# Patient Record
Sex: FEMALE | Race: WHITE | Employment: PART TIME | URBAN - METROPOLITAN AREA
[De-identification: names, ages, dates, MRNs, and addresses within clinical notes are randomized per-mention and may not be internally consistent; named-entity substitution may affect disease eponyms.]

---

## 2019-07-10 ENCOUNTER — HOSPITAL ENCOUNTER (EMERGENCY)
Age: 79
Discharge: HOME | End: 2019-07-10
Attending: EMERGENCY MEDICINE
Payer: MEDICARE

## 2019-07-10 ENCOUNTER — APPOINTMENT (OUTPATIENT)
Dept: GENERAL RADIOLOGY | Age: 79
End: 2019-07-10
Payer: MEDICARE

## 2019-07-10 ENCOUNTER — APPOINTMENT (OUTPATIENT)
Dept: CT IMAGING | Age: 79
End: 2019-07-10
Payer: MEDICARE

## 2019-07-10 ENCOUNTER — HOSPITAL ENCOUNTER (OUTPATIENT)
Dept: NON INVASIVE DIAGNOSTICS | Age: 79
Discharge: HOME | End: 2019-07-10
Payer: MEDICARE

## 2019-07-10 VITALS
BODY MASS INDEX: 35.49 KG/M2 | WEIGHT: 213 LBS | HEART RATE: 58 BPM | SYSTOLIC BLOOD PRESSURE: 159 MMHG | RESPIRATION RATE: 17 BRPM | OXYGEN SATURATION: 100 % | TEMPERATURE: 97.6 F | HEIGHT: 65 IN | DIASTOLIC BLOOD PRESSURE: 60 MMHG

## 2019-07-10 DIAGNOSIS — R06.00 DYSPNEA, UNSPECIFIED TYPE: Primary | ICD-10-CM

## 2019-07-10 LAB
ANION GAP SERPL CALCULATED.3IONS-SCNC: 10 MMOL/L (ref 3–16)
BASE EXCESS VENOUS: 4 (ref -3–3)
BASOPHILS ABSOLUTE: 0 K/UL (ref 0–0.2)
BASOPHILS RELATIVE PERCENT: 0.8 %
BUN BLDV-MCNC: 18 MG/DL (ref 7–20)
CALCIUM SERPL-MCNC: 9.7 MG/DL (ref 8.3–10.6)
CHLORIDE BLD-SCNC: 102 MMOL/L (ref 99–110)
CO2: 26 MMOL/L (ref 21–32)
CREAT SERPL-MCNC: 1 MG/DL (ref 0.6–1.2)
EKG ATRIAL RATE: 56 BPM
EKG DIAGNOSIS: NORMAL
EKG P AXIS: 48 DEGREES
EKG P-R INTERVAL: 194 MS
EKG Q-T INTERVAL: 434 MS
EKG QRS DURATION: 92 MS
EKG QTC CALCULATION (BAZETT): 418 MS
EKG R AXIS: 6 DEGREES
EKG T AXIS: 42 DEGREES
EKG VENTRICULAR RATE: 56 BPM
EOSINOPHILS ABSOLUTE: 0.1 K/UL (ref 0–0.6)
EOSINOPHILS RELATIVE PERCENT: 2 %
GFR AFRICAN AMERICAN: >60
GFR NON-AFRICAN AMERICAN: 54
GLUCOSE BLD-MCNC: 89 MG/DL (ref 70–99)
HCO3 VENOUS: 27.2 MMOL/L (ref 23–29)
HCT VFR BLD CALC: 41.9 % (ref 36–48)
HEMOGLOBIN: 13.9 G/DL (ref 12–16)
LV EF: 58 %
LVEF MODALITY: NORMAL
LYMPHOCYTES ABSOLUTE: 1.4 K/UL (ref 1–5.1)
LYMPHOCYTES RELATIVE PERCENT: 24.8 %
MCH RBC QN AUTO: 31.8 PG (ref 26–34)
MCHC RBC AUTO-ENTMCNC: 33.3 G/DL (ref 31–36)
MCV RBC AUTO: 95.6 FL (ref 80–100)
MONOCYTES ABSOLUTE: 0.4 K/UL (ref 0–1.3)
MONOCYTES RELATIVE PERCENT: 7.2 %
NEUTROPHILS ABSOLUTE: 3.6 K/UL (ref 1.7–7.7)
NEUTROPHILS RELATIVE PERCENT: 65.2 %
O2 SAT, VEN: 47 %
PCO2, VEN: 34.1 MM HG (ref 40–50)
PDW BLD-RTO: 13 % (ref 12.4–15.4)
PERFORMED ON: ABNORMAL
PH VENOUS: 7.51 (ref 7.35–7.45)
PLATELET # BLD: 216 K/UL (ref 135–450)
PMV BLD AUTO: 8.6 FL (ref 5–10.5)
PO2, VEN: 23 MM HG
POC SAMPLE TYPE: ABNORMAL
POTASSIUM REFLEX MAGNESIUM: 4.4 MMOL/L (ref 3.5–5.1)
PRO-BNP: 153 PG/ML (ref 0–449)
RBC # BLD: 4.38 M/UL (ref 4–5.2)
SODIUM BLD-SCNC: 138 MMOL/L (ref 136–145)
TCO2 CALC VENOUS: 28 MMOL/L
TROPONIN: <0.01 NG/ML
WBC # BLD: 5.6 K/UL (ref 4–11)

## 2019-07-10 PROCEDURE — 93306 TTE W/DOPPLER COMPLETE: CPT

## 2019-07-10 PROCEDURE — 71046 X-RAY EXAM CHEST 2 VIEWS: CPT

## 2019-07-10 PROCEDURE — 85025 COMPLETE CBC W/AUTO DIFF WBC: CPT

## 2019-07-10 PROCEDURE — 83880 ASSAY OF NATRIURETIC PEPTIDE: CPT

## 2019-07-10 PROCEDURE — 84484 ASSAY OF TROPONIN QUANT: CPT

## 2019-07-10 PROCEDURE — 71260 CT THORAX DX C+: CPT

## 2019-07-10 PROCEDURE — 93005 ELECTROCARDIOGRAM TRACING: CPT | Performed by: EMERGENCY MEDICINE

## 2019-07-10 PROCEDURE — 80048 BASIC METABOLIC PNL TOTAL CA: CPT

## 2019-07-10 PROCEDURE — 82803 BLOOD GASES ANY COMBINATION: CPT

## 2019-07-10 PROCEDURE — 6360000004 HC RX CONTRAST MEDICATION: Performed by: EMERGENCY MEDICINE

## 2019-07-10 PROCEDURE — 99285 EMERGENCY DEPT VISIT HI MDM: CPT

## 2019-07-10 RX ORDER — OMEGA-3S/DHA/EPA/FISH OIL/D3 300MG-1000
400 CAPSULE ORAL DAILY
COMMUNITY
End: 2019-07-24

## 2019-07-10 RX ORDER — ASPIRIN 81 MG/1
81 TABLET, CHEWABLE ORAL DAILY
COMMUNITY

## 2019-07-10 RX ORDER — CHLORAL HYDRATE 500 MG
2000 CAPSULE ORAL DAILY
COMMUNITY

## 2019-07-10 RX ADMIN — IOPAMIDOL 80 ML: 755 INJECTION, SOLUTION INTRAVENOUS at 13:31

## 2019-07-10 SDOH — HEALTH STABILITY: MENTAL HEALTH: HOW OFTEN DO YOU HAVE A DRINK CONTAINING ALCOHOL?: NEVER

## 2019-07-10 ASSESSMENT — ENCOUNTER SYMPTOMS
CHOKING: 0
ABDOMINAL DISTENTION: 0
STRIDOR: 0
VOMITING: 0
CHEST TIGHTNESS: 0
APNEA: 0
COUGH: 0
NAUSEA: 0
SHORTNESS OF BREATH: 1
DIARRHEA: 0
WHEEZING: 0
CONSTIPATION: 0
RHINORRHEA: 0
ABDOMINAL PAIN: 0

## 2019-07-10 NOTE — ED NOTES
Pt given dc instructions, verbalized understanding. Pt educated to follow up with cardiology, verbalized understanding. Pt has no further questions or concerns at this time. Pt in no signs of distress. Pt has steady gait to lobby.         Bishop Goodwin RN  07/10/19 [id number]

## 2019-07-10 NOTE — ED PROVIDER NOTES
[address] [facility] ENCOUNTER          PHYSICIAN ASSISTANT NOTE       Date of evaluation: 7/10/2019    Chief Complaint     Shortness of Breath (since June 2019)      History of Present Illness     Wille Babinski is a 67 y.o. female who presents with shortness of breath that has been occurring for over one month. She flew to [place] for one month ago and had an episode of exertional shortness of breath, extreme fatigue and a racing heartbeat. She states that ever since she has had significant shortness of breath on exertion. She has been seen by her primary care provider to arrange for an outpatient echo to be performed today. Patient states she has been short of breath throughout the entire day today ever since she got out of her car to go to the echocardiogram.  She denies any associated chest pain. She states her symptoms do improve slightly with rest, however she still feels short of breath today. Typically her symptoms last only a short amount of time after resting. She admits to occasional orthopnea, dizziness and recent bilateral leg swelling. She has had two knee replacements, neck and back surgery but the most recent surgery was in 2018. Patient currently denies chest pain, long periods of immobility, recent illness or sick contacts, nausea, vomiting, fevers, chills, vision changes or headaches. She denies a history of smoking, alcoholism, heart disease or diabetes. Denies any history of pulmonary embolism or deep venous thrombosis. Review of Systems     Review of Systems   Constitutional: Positive for fatigue. Negative for chills and fever. HENT: Negative for congestion and rhinorrhea. Eyes: Negative for visual disturbance. Respiratory: Positive for shortness of breath. Negative for apnea, cough, choking, chest tightness, wheezing and stridor. Cardiovascular: Positive for palpitations and leg swelling. Negative for chest pain.    Gastrointestinal: Negative for ER (500),  August Lerner ([id number]) on 7/10/2019 11:37:52 AMAlso confirmed by MD, ER (500),  Juanita Ma ([id number])  on 7/10/2019 11:38:59 AM     RECENT VITALS:  BP: (!) 159/60, Temp: 97.6 °F (36.4 °C), Pulse: 58, Resp: 17, SpO2: 100 %       ED Course     Nursing Notes, Past Medical Hx,Past Surgical Hx, Social Hx, Allergies, and Family Hx were reviewed. The patient was given the following medications:  Orders Placed This Encounter   Medications    iopamidol (ISOVUE-370) 76 % injection 80 mL     CONSULTS:  [address] [place] [address],[address] / ASSESSMENT / Griselda Rigo is a 67 y.o. female presenting with increasing shortness of breath for the past month. Vital signs demonstrated tachypnea and bradycardia with afebrile temperature and BP within normal limits. A detailed history and physical exam were performed listed above. Due to the patient's presenting symptoms, EKG, CXR, CBC/BNP, VBG, BNP and troponins were ordered to test for cardiac/respiratory etiology. VBG showed respiratory alkalosis, other tests were unremarkable. A CT chest was then ordered to rule out PE, which showed no evidence of one but did show groundglass basilar airspace disease. Patient has had a Holter monitor in the past which reportedly revealed no abnormalities. Given the patient's symptoms, we did contact cardiology to discuss her case. After their evaluation, they state they will see her in their office as an outpatient. The patient is feeling significantly improved in the emergency department and is requesting to be discharged. She was ambulated throughout the emergency department. Per report from nurse patient tolerated the walking very well. She continues to request to be discharged at this time. We did speak with cardiology who is arrange for follow-up as an outpatient. I have low suspicion for pulmonary embolism given negative CT scan.   I have low suspicion for ACS given normal EKG,

## 2019-07-10 NOTE — ED NOTES
Pt taken around ER unit with pulse ox at 90% and heart rate in the 130's. Pt states she felt normal at the pace she was walking and not short of breath at the time. Once pt was sat down o2 sat went to 98% in less than 30 seconds.       Alexis Mayen RN  07/10/19 [id number]

## 2019-07-24 ENCOUNTER — OFFICE VISIT (OUTPATIENT)
Dept: CARDIOLOGY CLINIC | Age: 79
End: 2019-07-24
Payer: MEDICARE

## 2019-07-24 ENCOUNTER — TELEPHONE (OUTPATIENT)
Dept: CARDIOLOGY CLINIC | Age: 79
End: 2019-07-24

## 2019-07-24 VITALS
HEART RATE: 58 BPM | BODY MASS INDEX: 35.78 KG/M2 | SYSTOLIC BLOOD PRESSURE: 126 MMHG | OXYGEN SATURATION: 96 % | WEIGHT: 215 LBS | DIASTOLIC BLOOD PRESSURE: 78 MMHG

## 2019-07-24 DIAGNOSIS — E78.00 HYPERCHOLESTEROLEMIA: ICD-10-CM

## 2019-07-24 DIAGNOSIS — R00.0 TACHYCARDIA: ICD-10-CM

## 2019-07-24 DIAGNOSIS — R06.02 SOB (SHORTNESS OF BREATH): ICD-10-CM

## 2019-07-24 DIAGNOSIS — R00.2 PALPITATIONS: ICD-10-CM

## 2019-07-24 DIAGNOSIS — I35.1 NONRHEUMATIC AORTIC VALVE INSUFFICIENCY: Primary | ICD-10-CM

## 2019-07-24 LAB
CHOLESTEROL, TOTAL: 177 MG/DL (ref 0–199)
HDLC SERPL-MCNC: 62 MG/DL (ref 40–60)
LDL CHOLESTEROL CALCULATED: 84 MG/DL
TRIGL SERPL-MCNC: 153 MG/DL (ref 0–150)
TSH REFLEX: 2.28 UIU/ML (ref 0.27–4.2)
VLDLC SERPL CALC-MCNC: 31 MG/DL

## 2019-07-24 PROCEDURE — 99204 OFFICE O/P NEW MOD 45 MIN: CPT | Performed by: INTERNAL MEDICINE

## 2019-07-24 PROCEDURE — 93000 ELECTROCARDIOGRAM COMPLETE: CPT | Performed by: INTERNAL MEDICINE

## 2019-07-24 PROCEDURE — 0296T PR EXT ECG > 48HR TO 21 DAY RCRD W/CONECT INTL RCRD: CPT | Performed by: INTERNAL MEDICINE

## 2019-07-24 NOTE — PROGRESS NOTES
headaches. No numbness/tingling, speech problems or weakness. No history of a stroke or TIA. Psychological: No anxiety or depression  Hematological and Lymphatic: No abnormal bleeding or bruising, blood clots, jaundice. Endocrine: No malaise/lethargy, palpitations, polydipsia/polyuria, temperature intolerance or unexpected weight changes. Skin: No rashes or non-healing ulcers. PE:  Blood pressure 126/78, pulse 58, weight 215 lb (97.5 kg), SpO2 96 %. General (appearance): Well devel. No distress. Eyes: Anicteric. EOMI  Neck: Supple. No JVD  Ears/Nose/Mouth/Thorat: No cyanosis  CV: RRR. No m/r/g  Respiratory:  Clear B, normal respiratory effort  GI: Abd soft. NT. No peritoneal signs. Skin: Warm, dry. No rashes  Neuro/Psych: Alert and oriented x 3. Appropriate behavior  Ext:  No c/c. Bilateral LE edema  Pulses:  2+ carotid. No bruits    Lab Results   Component Value Date    WBC 5.6 07/10/2019    HGB 13.9 07/10/2019    HCT 41.9 07/10/2019    MCV 95.6 07/10/2019     07/10/2019     Lab Results   Component Value Date     07/10/2019    K 4.4 07/10/2019     07/10/2019    CO2 26 07/10/2019    BUN 18 07/10/2019    CREATININE 1.0 07/10/2019    GLUCOSE 89 07/10/2019    CALCIUM 9.7 07/10/2019    PROT 7.1 06/02/2010    LABALBU 4.5 06/02/2010    BILITOT 0.80 06/02/2010    ALKPHOS 76 06/02/2010    AST 22 06/02/2010    ALT 26 06/02/2010    LABGLOM 54 (A) 07/10/2019    GFRAA >60 07/10/2019    AGRATIO 1.7 06/02/2010     Lipids with PCP    7/10/2019 ECG: NSR    7/2019 TTE: EF 55-60%. No RWMA. Mild-mod AR. Tr mr, pr, and TR. PASP 25.    7/2019 PE CT Scan:  1. No evidence of pulmonary embolus   2. Groundglass basilar airspace disease, dependent congestion, reactive airway disease, or basilar pneumonitis. 3. Nodules in the right lower chest, 2 of which are stable from prior study, there is size and appearance using criteria favors benign etiology.  Six-month follow-up is recommended       Current

## 2019-07-29 ENCOUNTER — TELEPHONE (OUTPATIENT)
Dept: CARDIOLOGY CLINIC | Age: 79
End: 2019-07-29

## 2019-07-31 ENCOUNTER — HOSPITAL ENCOUNTER (OUTPATIENT)
Dept: NON INVASIVE DIAGNOSTICS | Age: 79
Discharge: HOME | End: 2019-07-31
Payer: MEDICARE

## 2019-07-31 DIAGNOSIS — R06.02 SOB (SHORTNESS OF BREATH): ICD-10-CM

## 2019-07-31 DIAGNOSIS — R00.0 TACHYCARDIA: ICD-10-CM

## 2019-07-31 DIAGNOSIS — R00.2 PALPITATIONS: ICD-10-CM

## 2019-07-31 LAB
LV EF: 78 %
LVEF MODALITY: NORMAL

## 2019-07-31 PROCEDURE — A9502 TC99M TETROFOSMIN: HCPCS | Performed by: INTERNAL MEDICINE

## 2019-07-31 PROCEDURE — 93017 CV STRESS TEST TRACING ONLY: CPT

## 2019-07-31 PROCEDURE — 3430000000 HC RX DIAGNOSTIC RADIOPHARMACEUTICAL: Performed by: INTERNAL MEDICINE

## 2019-07-31 PROCEDURE — 78452 HT MUSCLE IMAGE SPECT MULT: CPT

## 2019-07-31 PROCEDURE — 2580000003 HC RX 258: Performed by: INTERNAL MEDICINE

## 2019-07-31 RX ORDER — SODIUM CHLORIDE 0.9 % (FLUSH) 0.9 %
10 SYRINGE (ML) INJECTION PRN
Status: DISCONTINUED | OUTPATIENT
Start: 2019-07-31 | End: 2019-08-01 | Stop reason: HOSPADM

## 2019-07-31 RX ADMIN — TETROFOSMIN 10 MILLICURIE: 1.38 INJECTION, POWDER, LYOPHILIZED, FOR SOLUTION INTRAVENOUS at 13:46

## 2019-07-31 RX ADMIN — Medication 10 ML: at 13:47

## 2019-08-19 DIAGNOSIS — R00.2 PALPITATIONS: ICD-10-CM

## 2019-08-27 ENCOUNTER — TELEPHONE (OUTPATIENT)
Dept: CARDIOLOGY CLINIC | Age: 79
End: 2019-08-27

## 2019-08-27 PROCEDURE — 0298T PR EXT ECG > 48HR TO 21 DAY REVIEW AND INTERPRETATN: CPT | Performed by: INTERNAL MEDICINE

## 2019-08-28 DIAGNOSIS — R00.0 TACHYCARDIA: ICD-10-CM

## 2019-10-20 ENCOUNTER — HOSPITAL ENCOUNTER (OUTPATIENT)
Age: 79
Setting detail: OBSERVATION
Discharge: HOME | End: 2019-10-21
Attending: EMERGENCY MEDICINE | Admitting: INTERNAL MEDICINE
Payer: MEDICARE

## 2019-10-20 ENCOUNTER — APPOINTMENT (OUTPATIENT)
Dept: CT IMAGING | Age: 79
End: 2019-10-20
Payer: MEDICARE

## 2019-10-20 ENCOUNTER — APPOINTMENT (OUTPATIENT)
Dept: GENERAL RADIOLOGY | Age: 79
End: 2019-10-20
Payer: MEDICARE

## 2019-10-20 DIAGNOSIS — N32.81 OVERACTIVE BLADDER: ICD-10-CM

## 2019-10-20 DIAGNOSIS — R55 SYNCOPE AND COLLAPSE: Primary | ICD-10-CM

## 2019-10-20 PROBLEM — E78.5 HYPERLIPIDEMIA: Status: ACTIVE | Noted: 2019-10-20

## 2019-10-20 LAB
A/G RATIO: 1.6 (ref 1.1–2.2)
ALBUMIN SERPL-MCNC: 4.1 G/DL (ref 3.4–5)
ALP BLD-CCNC: 72 U/L (ref 40–129)
ALT SERPL-CCNC: 18 U/L (ref 10–40)
ANION GAP SERPL CALCULATED.3IONS-SCNC: 9 MMOL/L (ref 3–16)
AST SERPL-CCNC: 16 U/L (ref 15–37)
BASOPHILS ABSOLUTE: 0 K/UL (ref 0–0.2)
BASOPHILS RELATIVE PERCENT: 0.6 %
BILIRUB SERPL-MCNC: 1 MG/DL (ref 0–1)
BILIRUBIN DIRECT: <0.2 MG/DL (ref 0–0.3)
BILIRUBIN, INDIRECT: NORMAL MG/DL (ref 0–1)
BUN BLDV-MCNC: 15 MG/DL (ref 7–20)
CALCIUM SERPL-MCNC: 9.4 MG/DL (ref 8.3–10.6)
CHLORIDE BLD-SCNC: 102 MMOL/L (ref 99–110)
CO2: 25 MMOL/L (ref 21–32)
CREAT SERPL-MCNC: 0.9 MG/DL (ref 0.6–1.2)
EOSINOPHILS ABSOLUTE: 0.1 K/UL (ref 0–0.6)
EOSINOPHILS RELATIVE PERCENT: 1.7 %
GFR AFRICAN AMERICAN: >60
GFR NON-AFRICAN AMERICAN: >60
GLOBULIN: 2.6 G/DL
GLUCOSE BLD-MCNC: 141 MG/DL (ref 70–99)
GLUCOSE BLD-MCNC: 169 MG/DL (ref 70–99)
HCT VFR BLD CALC: 44.6 % (ref 36–48)
HEMOGLOBIN: 14.9 G/DL (ref 12–16)
INR BLD: 0.96 (ref 0.86–1.14)
LACTIC ACID: 1.8 MMOL/L (ref 0.4–2)
LIPASE: 21 U/L (ref 13–60)
LYMPHOCYTES ABSOLUTE: 1.6 K/UL (ref 1–5.1)
LYMPHOCYTES RELATIVE PERCENT: 25.9 %
MCH RBC QN AUTO: 32.4 PG (ref 26–34)
MCHC RBC AUTO-ENTMCNC: 33.5 G/DL (ref 31–36)
MCV RBC AUTO: 96.9 FL (ref 80–100)
MONOCYTES ABSOLUTE: 0.4 K/UL (ref 0–1.3)
MONOCYTES RELATIVE PERCENT: 7.3 %
NEUTROPHILS ABSOLUTE: 3.9 K/UL (ref 1.7–7.7)
NEUTROPHILS RELATIVE PERCENT: 64.5 %
PDW BLD-RTO: 13.2 % (ref 12.4–15.4)
PERFORMED ON: ABNORMAL
PLATELET # BLD: 209 K/UL (ref 135–450)
PMV BLD AUTO: 8.4 FL (ref 5–10.5)
POTASSIUM REFLEX MAGNESIUM: 4.2 MMOL/L (ref 3.5–5.1)
PROTHROMBIN TIME: 11 SEC (ref 9.8–13)
RBC # BLD: 4.6 M/UL (ref 4–5.2)
SODIUM BLD-SCNC: 136 MMOL/L (ref 136–145)
TOTAL PROTEIN: 6.7 G/DL (ref 6.4–8.2)
TROPONIN: <0.01 NG/ML
WBC # BLD: 6.1 K/UL (ref 4–11)

## 2019-10-20 PROCEDURE — 2580000003 HC RX 258: Performed by: INTERNAL MEDICINE

## 2019-10-20 PROCEDURE — 6360000002 HC RX W HCPCS: Performed by: EMERGENCY MEDICINE

## 2019-10-20 PROCEDURE — 6360000004 HC RX CONTRAST MEDICATION: Performed by: EMERGENCY MEDICINE

## 2019-10-20 PROCEDURE — 96361 HYDRATE IV INFUSION ADD-ON: CPT

## 2019-10-20 PROCEDURE — 2580000003 HC RX 258: Performed by: EMERGENCY MEDICINE

## 2019-10-20 PROCEDURE — 83605 ASSAY OF LACTIC ACID: CPT

## 2019-10-20 PROCEDURE — 6370000000 HC RX 637 (ALT 250 FOR IP): Performed by: INTERNAL MEDICINE

## 2019-10-20 PROCEDURE — 74177 CT ABD & PELVIS W/CONTRAST: CPT

## 2019-10-20 PROCEDURE — 85025 COMPLETE CBC W/AUTO DIFF WBC: CPT

## 2019-10-20 PROCEDURE — 85610 PROTHROMBIN TIME: CPT

## 2019-10-20 PROCEDURE — 84484 ASSAY OF TROPONIN QUANT: CPT

## 2019-10-20 PROCEDURE — 71045 X-RAY EXAM CHEST 1 VIEW: CPT

## 2019-10-20 PROCEDURE — 96374 THER/PROPH/DIAG INJ IV PUSH: CPT

## 2019-10-20 PROCEDURE — 99285 EMERGENCY DEPT VISIT HI MDM: CPT

## 2019-10-20 PROCEDURE — 93005 ELECTROCARDIOGRAM TRACING: CPT | Performed by: PHYSICIAN ASSISTANT

## 2019-10-20 PROCEDURE — 2580000003 HC RX 258: Performed by: PHYSICIAN ASSISTANT

## 2019-10-20 PROCEDURE — 80053 COMPREHEN METABOLIC PANEL: CPT

## 2019-10-20 PROCEDURE — G0378 HOSPITAL OBSERVATION PER HR: HCPCS

## 2019-10-20 PROCEDURE — 83690 ASSAY OF LIPASE: CPT

## 2019-10-20 PROCEDURE — 70450 CT HEAD/BRAIN W/O DYE: CPT

## 2019-10-20 PROCEDURE — 36415 COLL VENOUS BLD VENIPUNCTURE: CPT

## 2019-10-20 RX ORDER — FAMOTIDINE 20 MG/1
20 TABLET, FILM COATED ORAL 2 TIMES DAILY
Status: DISCONTINUED | OUTPATIENT
Start: 2019-10-20 | End: 2019-10-21 | Stop reason: HOSPADM

## 2019-10-20 RX ORDER — ASPIRIN 81 MG/1
81 TABLET, CHEWABLE ORAL DAILY
Status: DISCONTINUED | OUTPATIENT
Start: 2019-10-20 | End: 2019-10-21 | Stop reason: HOSPADM

## 2019-10-20 RX ORDER — ACETAMINOPHEN 325 MG/1
650 TABLET ORAL EVERY 4 HOURS PRN
Status: DISCONTINUED | OUTPATIENT
Start: 2019-10-20 | End: 2019-10-21 | Stop reason: HOSPADM

## 2019-10-20 RX ORDER — LORAZEPAM 2 MG/ML
1 INJECTION INTRAMUSCULAR ONCE
Status: COMPLETED | OUTPATIENT
Start: 2019-10-20 | End: 2019-10-20

## 2019-10-20 RX ORDER — 0.9 % SODIUM CHLORIDE 0.9 %
1000 INTRAVENOUS SOLUTION INTRAVENOUS ONCE
Status: COMPLETED | OUTPATIENT
Start: 2019-10-20 | End: 2019-10-20

## 2019-10-20 RX ORDER — SODIUM CHLORIDE 0.9 % (FLUSH) 0.9 %
10 SYRINGE (ML) INJECTION EVERY 12 HOURS SCHEDULED
Status: DISCONTINUED | OUTPATIENT
Start: 2019-10-20 | End: 2019-10-21 | Stop reason: HOSPADM

## 2019-10-20 RX ORDER — ATORVASTATIN CALCIUM 20 MG/1
20 TABLET, FILM COATED ORAL DAILY
Status: DISCONTINUED | OUTPATIENT
Start: 2019-10-20 | End: 2019-10-21 | Stop reason: HOSPADM

## 2019-10-20 RX ORDER — SODIUM CHLORIDE 0.9 % (FLUSH) 0.9 %
10 SYRINGE (ML) INJECTION PRN
Status: DISCONTINUED | OUTPATIENT
Start: 2019-10-20 | End: 2019-10-21 | Stop reason: HOSPADM

## 2019-10-20 RX ORDER — ONDANSETRON 2 MG/ML
4 INJECTION INTRAMUSCULAR; INTRAVENOUS EVERY 4 HOURS PRN
Status: DISCONTINUED | OUTPATIENT
Start: 2019-10-20 | End: 2019-10-21 | Stop reason: HOSPADM

## 2019-10-20 RX ORDER — SENNA AND DOCUSATE SODIUM 50; 8.6 MG/1; MG/1
2 TABLET, FILM COATED ORAL ONCE
Status: DISCONTINUED | OUTPATIENT
Start: 2019-10-20 | End: 2019-10-21 | Stop reason: HOSPADM

## 2019-10-20 RX ADMIN — IOPAMIDOL 80 ML: 755 INJECTION, SOLUTION INTRAVENOUS at 16:06

## 2019-10-20 RX ADMIN — ATORVASTATIN CALCIUM 20 MG: 20 TABLET, FILM COATED ORAL at 21:52

## 2019-10-20 RX ADMIN — Medication 10 ML: at 21:53

## 2019-10-20 RX ADMIN — SODIUM CHLORIDE 1000 ML: 9 INJECTION, SOLUTION INTRAVENOUS at 14:45

## 2019-10-20 RX ADMIN — LORAZEPAM 1 MG: 2 INJECTION INTRAMUSCULAR; INTRAVENOUS at 14:58

## 2019-10-20 ASSESSMENT — PAIN SCALES - GENERAL: PAINLEVEL_OUTOF10: 0

## 2019-10-21 ENCOUNTER — APPOINTMENT (OUTPATIENT)
Dept: VASCULAR LAB | Age: 79
End: 2019-10-21
Payer: MEDICARE

## 2019-10-21 VITALS
DIASTOLIC BLOOD PRESSURE: 63 MMHG | BODY MASS INDEX: 35.34 KG/M2 | RESPIRATION RATE: 16 BRPM | SYSTOLIC BLOOD PRESSURE: 140 MMHG | TEMPERATURE: 98.4 F | WEIGHT: 212.08 LBS | OXYGEN SATURATION: 95 % | HEART RATE: 70 BPM | HEIGHT: 65 IN

## 2019-10-21 LAB
ANION GAP SERPL CALCULATED.3IONS-SCNC: 5 MMOL/L (ref 3–16)
BASOPHILS ABSOLUTE: 0 K/UL (ref 0–0.2)
BASOPHILS RELATIVE PERCENT: 0.4 %
BUN BLDV-MCNC: 13 MG/DL (ref 7–20)
CALCIUM SERPL-MCNC: 9.1 MG/DL (ref 8.3–10.6)
CHLORIDE BLD-SCNC: 105 MMOL/L (ref 99–110)
CO2: 28 MMOL/L (ref 21–32)
CREAT SERPL-MCNC: 0.8 MG/DL (ref 0.6–1.2)
EKG ATRIAL RATE: 61 BPM
EKG DIAGNOSIS: NORMAL
EKG P AXIS: 56 DEGREES
EKG P-R INTERVAL: 194 MS
EKG Q-T INTERVAL: 472 MS
EKG QRS DURATION: 92 MS
EKG QTC CALCULATION (BAZETT): 475 MS
EKG R AXIS: 53 DEGREES
EKG T AXIS: 60 DEGREES
EKG VENTRICULAR RATE: 61 BPM
EOSINOPHILS ABSOLUTE: 0.1 K/UL (ref 0–0.6)
EOSINOPHILS RELATIVE PERCENT: 1.6 %
GFR AFRICAN AMERICAN: >60
GFR NON-AFRICAN AMERICAN: >60
GLUCOSE BLD-MCNC: 107 MG/DL (ref 70–99)
HCT VFR BLD CALC: 39.9 % (ref 36–48)
HEMOGLOBIN: 13.1 G/DL (ref 12–16)
LYMPHOCYTES ABSOLUTE: 1.4 K/UL (ref 1–5.1)
LYMPHOCYTES RELATIVE PERCENT: 20.8 %
MCH RBC QN AUTO: 31.8 PG (ref 26–34)
MCHC RBC AUTO-ENTMCNC: 32.7 G/DL (ref 31–36)
MCV RBC AUTO: 97.1 FL (ref 80–100)
MONOCYTES ABSOLUTE: 0.5 K/UL (ref 0–1.3)
MONOCYTES RELATIVE PERCENT: 7.7 %
NEUTROPHILS ABSOLUTE: 4.7 K/UL (ref 1.7–7.7)
NEUTROPHILS RELATIVE PERCENT: 69.5 %
PDW BLD-RTO: 13.3 % (ref 12.4–15.4)
PLATELET # BLD: 198 K/UL (ref 135–450)
PMV BLD AUTO: 8.3 FL (ref 5–10.5)
POTASSIUM REFLEX MAGNESIUM: 4.1 MMOL/L (ref 3.5–5.1)
RBC # BLD: 4.11 M/UL (ref 4–5.2)
SODIUM BLD-SCNC: 138 MMOL/L (ref 136–145)
WBC # BLD: 6.7 K/UL (ref 4–11)

## 2019-10-21 PROCEDURE — G0378 HOSPITAL OBSERVATION PER HR: HCPCS

## 2019-10-21 PROCEDURE — 80048 BASIC METABOLIC PNL TOTAL CA: CPT

## 2019-10-21 PROCEDURE — 85025 COMPLETE CBC W/AUTO DIFF WBC: CPT

## 2019-10-21 PROCEDURE — 93880 EXTRACRANIAL BILAT STUDY: CPT

## 2019-10-21 PROCEDURE — 2580000003 HC RX 258: Performed by: INTERNAL MEDICINE

## 2019-10-21 PROCEDURE — 96372 THER/PROPH/DIAG INJ SC/IM: CPT

## 2019-10-21 PROCEDURE — 6360000002 HC RX W HCPCS: Performed by: INTERNAL MEDICINE

## 2019-10-21 PROCEDURE — 90686 IIV4 VACC NO PRSV 0.5 ML IM: CPT | Performed by: INTERNAL MEDICINE

## 2019-10-21 PROCEDURE — 6370000000 HC RX 637 (ALT 250 FOR IP): Performed by: INTERNAL MEDICINE

## 2019-10-21 PROCEDURE — 36415 COLL VENOUS BLD VENIPUNCTURE: CPT

## 2019-10-21 PROCEDURE — G0008 ADMIN INFLUENZA VIRUS VAC: HCPCS | Performed by: INTERNAL MEDICINE

## 2019-10-21 RX ADMIN — ENOXAPARIN SODIUM 40 MG: 40 INJECTION SUBCUTANEOUS at 09:31

## 2019-10-21 RX ADMIN — ASPIRIN 81 MG 81 MG: 81 TABLET ORAL at 09:31

## 2019-10-21 RX ADMIN — INFLUENZA A VIRUS A/BRISBANE/02/2018 IVR-190 (H1N1) ANTIGEN (PROPIOLACTONE INACTIVATED), INFLUENZA A VIRUS A/KANSAS/14/2017 X-327 (H3N2) ANTIGEN (PROPIOLACTONE INACTIVATED), INFLUENZA B VIRUS B/MARYLAND/15/2016 ANTIGEN (PROPIOLACTONE INACTIVATED), INFLUENZA B VIRUS B/PHUKET/3073/2013 BVR-1B ANTIGEN (PROPIOLACTONE INACTIVATED) 0.5 ML: 15; 15; 15; 15 INJECTION, SUSPENSION INTRAMUSCULAR at 12:11

## 2019-10-21 RX ADMIN — ATORVASTATIN CALCIUM 20 MG: 20 TABLET, FILM COATED ORAL at 09:31

## 2019-10-21 RX ADMIN — Medication 10 ML: at 09:31

## 2019-10-21 RX ADMIN — FAMOTIDINE 20 MG: 20 TABLET, FILM COATED ORAL at 09:31

## 2019-10-21 ASSESSMENT — PAIN SCALES - GENERAL
PAINLEVEL_OUTOF10: 0
PAINLEVEL_OUTOF10: 6
PAINLEVEL_OUTOF10: 0

## 2019-10-21 ASSESSMENT — PAIN - FUNCTIONAL ASSESSMENT
PAIN_FUNCTIONAL_ASSESSMENT: ACTIVITIES ARE NOT PREVENTED
PAIN_FUNCTIONAL_ASSESSMENT: ACTIVITIES ARE NOT PREVENTED

## 2019-10-21 ASSESSMENT — PAIN DESCRIPTION - LOCATION: LOCATION: ABDOMEN

## 2019-10-21 ASSESSMENT — PAIN DESCRIPTION - ONSET: ONSET: SUDDEN

## 2019-10-21 ASSESSMENT — PAIN DESCRIPTION - ORIENTATION: ORIENTATION: RIGHT

## 2019-10-21 ASSESSMENT — PAIN DESCRIPTION - DESCRIPTORS: DESCRIPTORS: ACHING

## 2019-10-21 ASSESSMENT — PAIN DESCRIPTION - PROGRESSION: CLINICAL_PROGRESSION: NOT CHANGED

## 2019-10-21 ASSESSMENT — PAIN DESCRIPTION - PAIN TYPE: TYPE: CHRONIC PAIN

## 2019-10-21 ASSESSMENT — PAIN DESCRIPTION - FREQUENCY: FREQUENCY: CONTINUOUS

## 2019-10-30 ENCOUNTER — OFFICE VISIT (OUTPATIENT)
Dept: CARDIOLOGY CLINIC | Age: 79
End: 2019-10-30
Payer: MEDICARE

## 2019-10-30 VITALS
DIASTOLIC BLOOD PRESSURE: 68 MMHG | BODY MASS INDEX: 35.11 KG/M2 | SYSTOLIC BLOOD PRESSURE: 104 MMHG | WEIGHT: 211 LBS | HEART RATE: 60 BPM

## 2019-10-30 DIAGNOSIS — I47.1 SVT (SUPRAVENTRICULAR TACHYCARDIA) (HCC): ICD-10-CM

## 2019-10-30 DIAGNOSIS — E78.00 HYPERCHOLESTEROLEMIA: ICD-10-CM

## 2019-10-30 DIAGNOSIS — I35.1 NONRHEUMATIC AORTIC VALVE INSUFFICIENCY: ICD-10-CM

## 2019-10-30 DIAGNOSIS — R00.2 PALPITATIONS: Primary | ICD-10-CM

## 2019-10-30 DIAGNOSIS — R55 SYNCOPE, UNSPECIFIED SYNCOPE TYPE: ICD-10-CM

## 2019-10-30 PROCEDURE — 99214 OFFICE O/P EST MOD 30 MIN: CPT | Performed by: INTERNAL MEDICINE

## 2019-10-30 RX ORDER — MULTIVIT,IRON,MINERALS/LUTEIN
TABLET ORAL
COMMUNITY

## 2019-11-20 ENCOUNTER — TELEPHONE (OUTPATIENT)
Dept: CASE MANAGEMENT | Age: 79
End: 2019-11-20

## 2019-11-27 ENCOUNTER — OFFICE VISIT (OUTPATIENT)
Dept: ENT CLINIC | Age: 79
End: 2019-11-27
Payer: MEDICARE

## 2019-11-27 VITALS
HEART RATE: 76 BPM | TEMPERATURE: 98.5 F | DIASTOLIC BLOOD PRESSURE: 65 MMHG | BODY MASS INDEX: 35.11 KG/M2 | SYSTOLIC BLOOD PRESSURE: 123 MMHG | HEIGHT: 65 IN

## 2019-11-27 DIAGNOSIS — R04.0 EPISTAXIS: Primary | ICD-10-CM

## 2019-11-27 DIAGNOSIS — H90.2 EXTERNAL EAR CONDUCTIVE HEARING LOSS: ICD-10-CM

## 2019-11-27 DIAGNOSIS — H61.23 BILATERAL IMPACTED CERUMEN: ICD-10-CM

## 2019-11-27 PROCEDURE — 69210 REMOVE IMPACTED EAR WAX UNI: CPT | Performed by: OTOLARYNGOLOGY

## 2019-11-27 PROCEDURE — 99203 OFFICE O/P NEW LOW 30 MIN: CPT | Performed by: OTOLARYNGOLOGY

## 2019-11-27 PROCEDURE — 30903 CONTROL OF NOSEBLEED: CPT | Performed by: OTOLARYNGOLOGY

## 2019-11-27 RX ORDER — ACETAMINOPHEN 160 MG
TABLET,DISINTEGRATING ORAL
COMMUNITY

## 2019-12-23 ENCOUNTER — OFFICE VISIT (OUTPATIENT)
Dept: ENT CLINIC | Age: 79
End: 2019-12-23
Payer: MEDICARE

## 2019-12-23 VITALS
DIASTOLIC BLOOD PRESSURE: 58 MMHG | HEIGHT: 65 IN | TEMPERATURE: 96.8 F | BODY MASS INDEX: 35.11 KG/M2 | HEART RATE: 65 BPM | SYSTOLIC BLOOD PRESSURE: 119 MMHG

## 2019-12-23 DIAGNOSIS — R04.0 EPISTAXIS: Primary | ICD-10-CM

## 2019-12-23 PROCEDURE — 30901 CONTROL OF NOSEBLEED: CPT | Performed by: OTOLARYNGOLOGY

## 2019-12-23 PROCEDURE — 99212 OFFICE O/P EST SF 10 MIN: CPT | Performed by: OTOLARYNGOLOGY

## 2020-03-24 ENCOUNTER — TELEPHONE (OUTPATIENT)
Dept: CARDIOLOGY CLINIC | Age: 80
End: 2020-03-24

## 2020-03-24 NOTE — TELEPHONE ENCOUNTER
Patient states her family has noticed her mouth is shaking and her family dr would like her to see a neurologist    She prefers one for [address] [place] [address]    Please advise  #[phone]

## 2020-03-27 NOTE — TELEPHONE ENCOUNTER
Spoke with patient let her know Dr. Jovan Lewis recommends  neurology. Patient verbalized understanding.

## 2020-05-06 ENCOUNTER — HOSPITAL ENCOUNTER (EMERGENCY)
Age: 80
Discharge: HOME | End: 2020-05-06
Attending: EMERGENCY MEDICINE
Payer: MEDICARE

## 2020-05-06 ENCOUNTER — APPOINTMENT (OUTPATIENT)
Dept: GENERAL RADIOLOGY | Age: 80
End: 2020-05-06
Payer: MEDICARE

## 2020-05-06 VITALS
DIASTOLIC BLOOD PRESSURE: 61 MMHG | OXYGEN SATURATION: 97 % | RESPIRATION RATE: 30 BRPM | HEART RATE: 62 BPM | TEMPERATURE: 97.9 F | SYSTOLIC BLOOD PRESSURE: 155 MMHG

## 2020-05-06 LAB
A/G RATIO: 1.4 (ref 1.1–2.2)
ALBUMIN SERPL-MCNC: 3.9 G/DL (ref 3.4–5)
ALP BLD-CCNC: 71 U/L (ref 40–129)
ALT SERPL-CCNC: 19 U/L (ref 10–40)
ANION GAP SERPL CALCULATED.3IONS-SCNC: 10 MMOL/L (ref 3–16)
AST SERPL-CCNC: 17 U/L (ref 15–37)
BACTERIA: ABNORMAL /HPF
BASE EXCESS VENOUS: 3.2 MMOL/L (ref -2–3)
BASOPHILS ABSOLUTE: 0 K/UL (ref 0–0.2)
BASOPHILS RELATIVE PERCENT: 0.8 %
BILIRUB SERPL-MCNC: 0.5 MG/DL (ref 0–1)
BILIRUBIN URINE: NEGATIVE
BLOOD, URINE: NEGATIVE
BUN BLDV-MCNC: 14 MG/DL (ref 7–20)
CALCIUM SERPL-MCNC: 9.8 MG/DL (ref 8.3–10.6)
CARBOXYHEMOGLOBIN: 1.1 % (ref 0–1.5)
CHLORIDE BLD-SCNC: 104 MMOL/L (ref 99–110)
CLARITY: CLEAR
CO2: 28 MMOL/L (ref 21–32)
COLOR: YELLOW
CREAT SERPL-MCNC: 0.8 MG/DL (ref 0.6–1.2)
D DIMER: 211 NG/ML DDU (ref 0–229)
EKG ATRIAL RATE: 56 BPM
EKG DIAGNOSIS: NORMAL
EKG P AXIS: 147 DEGREES
EKG P-R INTERVAL: 216 MS
EKG Q-T INTERVAL: 402 MS
EKG QRS DURATION: 94 MS
EKG QTC CALCULATION (BAZETT): 387 MS
EKG R AXIS: 10 DEGREES
EKG T AXIS: 119 DEGREES
EKG VENTRICULAR RATE: 56 BPM
EOSINOPHILS ABSOLUTE: 0.2 K/UL (ref 0–0.6)
EOSINOPHILS RELATIVE PERCENT: 3.5 %
EPITHELIAL CELLS, UA: ABNORMAL /HPF (ref 0–5)
GFR AFRICAN AMERICAN: >60
GFR NON-AFRICAN AMERICAN: >60
GLOBULIN: 2.7 G/DL
GLUCOSE BLD-MCNC: 88 MG/DL (ref 70–99)
GLUCOSE URINE: NEGATIVE MG/DL
HCO3 VENOUS: 29.6 MMOL/L (ref 24–28)
HCT VFR BLD CALC: 42.6 % (ref 36–48)
HEMOGLOBIN, VEN, REDUCED: 62.1 %
HEMOGLOBIN: 14 G/DL (ref 12–16)
KETONES, URINE: ABNORMAL MG/DL
LEUKOCYTE ESTERASE, URINE: ABNORMAL
LYMPHOCYTES ABSOLUTE: 1.4 K/UL (ref 1–5.1)
LYMPHOCYTES RELATIVE PERCENT: 27 %
MCH RBC QN AUTO: 31.6 PG (ref 26–34)
MCHC RBC AUTO-ENTMCNC: 32.9 G/DL (ref 31–36)
MCV RBC AUTO: 96 FL (ref 80–100)
METHEMOGLOBIN VENOUS: 0.7 % (ref 0–1.5)
MICROSCOPIC EXAMINATION: YES
MONOCYTES ABSOLUTE: 0.5 K/UL (ref 0–1.3)
MONOCYTES RELATIVE PERCENT: 9.8 %
NEUTROPHILS ABSOLUTE: 3 K/UL (ref 1.7–7.7)
NEUTROPHILS RELATIVE PERCENT: 58.9 %
NITRITE, URINE: POSITIVE
O2 SAT, VEN: 37 %
PCO2, VEN: 55.3 MMHG (ref 41–51)
PDW BLD-RTO: 13.3 % (ref 12.4–15.4)
PH UA: 6.5 (ref 5–8)
PH VENOUS: 7.35 (ref 7.35–7.45)
PLATELET # BLD: 188 K/UL (ref 135–450)
PMV BLD AUTO: 8.4 FL (ref 5–10.5)
PO2, VEN: 28.9 MMHG (ref 25–40)
POTASSIUM REFLEX MAGNESIUM: 4.2 MMOL/L (ref 3.5–5.1)
PRO-BNP: 205 PG/ML (ref 0–449)
PROTEIN UA: ABNORMAL MG/DL
RBC # BLD: 4.44 M/UL (ref 4–5.2)
RBC UA: ABNORMAL /HPF (ref 0–4)
SODIUM BLD-SCNC: 142 MMOL/L (ref 136–145)
SPECIFIC GRAVITY UA: 1.02 (ref 1–1.03)
TCO2 CALC VENOUS: 31 MMOL/L
TOTAL PROTEIN: 6.6 G/DL (ref 6.4–8.2)
TROPONIN: <0.01 NG/ML
URINE TYPE: ABNORMAL
UROBILINOGEN, URINE: 0.2 E.U./DL
WBC # BLD: 5.1 K/UL (ref 4–11)
WBC UA: ABNORMAL /HPF (ref 0–5)

## 2020-05-06 PROCEDURE — 85379 FIBRIN DEGRADATION QUANT: CPT

## 2020-05-06 PROCEDURE — 83880 ASSAY OF NATRIURETIC PEPTIDE: CPT

## 2020-05-06 PROCEDURE — 81001 URINALYSIS AUTO W/SCOPE: CPT

## 2020-05-06 PROCEDURE — 6370000000 HC RX 637 (ALT 250 FOR IP): Performed by: PHYSICIAN ASSISTANT

## 2020-05-06 PROCEDURE — 85025 COMPLETE CBC W/AUTO DIFF WBC: CPT

## 2020-05-06 PROCEDURE — 93005 ELECTROCARDIOGRAM TRACING: CPT | Performed by: EMERGENCY MEDICINE

## 2020-05-06 PROCEDURE — 80053 COMPREHEN METABOLIC PANEL: CPT

## 2020-05-06 PROCEDURE — 71046 X-RAY EXAM CHEST 2 VIEWS: CPT

## 2020-05-06 PROCEDURE — 87186 SC STD MICRODIL/AGAR DIL: CPT

## 2020-05-06 PROCEDURE — 82803 BLOOD GASES ANY COMBINATION: CPT

## 2020-05-06 PROCEDURE — 84484 ASSAY OF TROPONIN QUANT: CPT

## 2020-05-06 PROCEDURE — 87086 URINE CULTURE/COLONY COUNT: CPT

## 2020-05-06 PROCEDURE — 87077 CULTURE AEROBIC IDENTIFY: CPT

## 2020-05-06 PROCEDURE — 99285 EMERGENCY DEPT VISIT HI MDM: CPT

## 2020-05-06 RX ORDER — CEPHALEXIN 500 MG/1
500 CAPSULE ORAL ONCE
Status: COMPLETED | OUTPATIENT
Start: 2020-05-06 | End: 2020-05-06

## 2020-05-06 RX ORDER — CEPHALEXIN 500 MG/1
500 CAPSULE ORAL 4 TIMES DAILY
Qty: 40 CAPSULE | Refills: 0 | Status: SHIPPED | OUTPATIENT
Start: 2020-05-06 | End: 2020-05-16

## 2020-05-06 RX ADMIN — CEPHALEXIN 500 MG: 500 CAPSULE ORAL at 17:24

## 2020-05-06 NOTE — ED TRIAGE NOTES
Pt came in for SOB, pt denies all other symptoms. Pt is bradycardic. Pt states she saw MD Leyla Navarrete. Pt states that MD Leyla Navarrete sent an order over here. Will inform MD Hassan.

## 2020-05-06 NOTE — ED PROVIDER NOTES
40 - 129 U/L    ALT 19 10 - 40 U/L    AST 17 15 - 37 U/L    Globulin 2.7 g/dL   Brain Natriuretic Peptide   Result Value Ref Range    Pro- 0 - 449 pg/mL   Troponin   Result Value Ref Range    Troponin <0.01 <0.01 ng/mL   Urinalysis, reflex to microscopic   Result Value Ref Range    Color, UA Yellow Straw/Yellow    Clarity, UA Clear Clear    Glucose, Ur Negative Negative mg/dL    Bilirubin Urine Negative Negative    Ketones, Urine TRACE (A) Negative mg/dL    Specific Gravity, UA 1.020 1.005 - 1.030    Blood, Urine Negative Negative    pH, UA 6.5 5.0 - 8.0    Protein, UA TRACE (A) Negative mg/dL    Urobilinogen, Urine 0.2 <2.0 E.U./dL    Nitrite, Urine POSITIVE (A) Negative    Leukocyte Esterase, Urine MODERATE (A) Negative    Microscopic Examination YES     Urine Type NotGiven    Blood Gas, Venous   Result Value Ref Range    pH, Frank 7.349 (L) 7.350 - 7.450    pCO2, Frank 55.3 (H) 41.0 - 51.0 mmHg    pO2, Frank 28.9 25.0 - 40.0 mmHg    HCO3, Venous 29.6 (H) 24.0 - 28.0 mmol/L    Base Excess, Frank 3.2 (H) -2.0 - 3.0 mmol/L    O2 Sat, Frank 37 Not established %    Carboxyhemoglobin 1.1 0.0 - 1.5 %    MetHgb, Frank 0.7 0.0 - 1.5 %    TC02 (Calc), Frank 31 mmol/L    Hemoglobin, Frank, Reduced 62.10 %   D-dimer, quantitative (Lab)   Result Value Ref Range    D-Dimer, Quant 211 0 - 229 ng/mL DDU   Microscopic Urinalysis   Result Value Ref Range    WBC, UA 10-20 (A) 0 - 5 /HPF    RBC, UA 0-2 0 - 4 /HPF    Epithelial Cells, UA 0-1 0 - 5 /HPF    Bacteria, UA 3+ (A) None Seen /HPF   EKG 12 Lead   Result Value Ref Range    Ventricular Rate 56 BPM    Atrial Rate 56 BPM    P-R Interval 216 ms    QRS Duration 94 ms    Q-T Interval 402 ms    QTc Calculation (Bazett) 387 ms    P Axis 147 degrees    R Axis 10 degrees    T Axis 119 degrees    Diagnosis       EKG performed in ER and to be interpreted by ER physician. Confirmed by MD, ER ([id number]),  Yoandy Rouse ([phone]) on 5/6/2020 3:17:08 PM       RECENT VITALS:  BP: (!) 139/110, Temp: 97.9
pH, Frank 7.349 (L) 7.350 - 7.450    pCO2, Frank 55.3 (H) 41.0 - 51.0 mmHg    pO2, Frank 28.9 25.0 - 40.0 mmHg    HCO3, Venous 29.6 (H) 24.0 - 28.0 mmol/L    Base Excess, Frank 3.2 (H) -2.0 - 3.0 mmol/L    O2 Sat, Frank 37 Not established %    Carboxyhemoglobin 1.1 0.0 - 1.5 %    MetHgb, Frank 0.7 0.0 - 1.5 %    TC02 (Calc), Frank 31 mmol/L    Hemoglobin, Frank, Reduced 62.10 %   D-dimer, quantitative (Lab)   Result Value Ref Range    D-Dimer, Quant 211 0 - 229 ng/mL DDU   Microscopic Urinalysis   Result Value Ref Range    WBC, UA 10-20 (A) 0 - 5 /HPF    RBC, UA 0-2 0 - 4 /HPF    Epithelial Cells, UA 0-1 0 - 5 /HPF    Bacteria, UA 3+ (A) None Seen /HPF   EKG 12 Lead   Result Value Ref Range    Ventricular Rate 56 BPM    Atrial Rate 56 BPM    P-R Interval 216 ms    QRS Duration 94 ms    Q-T Interval 402 ms    QTc Calculation (Bazett) 387 ms    P Axis 147 degrees    R Axis 10 degrees    T Axis 119 degrees    Diagnosis       EKG performed in ER and to be interpreted by ER physician. Confirmed by MD, ER ([id number]),  Anisa Shen ([phone]) on 5/6/2020 3:17:08 PM       ED BEDSIDE ULTRASOUND:      RECENT VITALS:  BP: (!) 139/110, Temp: 97.9 °F (36.6 °C), Pulse: 51, Resp: 18, SpO2: 100 %     Procedures         ED Course     Nursing Notes, Past Medical Hx, Past Surgical Hx, Social Hx, Allergies, and Family Hx were reviewed. The patient was given the following medications:  Orders Placed This Encounter   Medications    cephALEXin (KEFLEX) capsule 500 mg       CONSULTS:  None    MEDICAL DECISION MAKING / ASSESSMENT / Jessica Dowell is a 67 y.o. female presents today with shortness of breath and near syncope. On examination she appears well with stable vitals but is notably bradycardic between 50-60 bpm.  Normotensive. Her examination is noncontributory with no adventitious breath sounds, carotid bruits, or swelling of the extremities.     Differential diagnosis at this time includes but is not limited to: Pulmonary embolism,

## 2020-05-06 NOTE — ED NOTES
Ambulated pt in hallway. Pt O2 sat 93%-97%. Pt denied dizziness during ambulation.       Alyssia Balbuena RN  05/06/20 [id number]

## 2020-05-07 ENCOUNTER — TELEPHONE (OUTPATIENT)
Dept: OTHER | Facility: CLINIC | Age: 80
End: 2020-05-07

## 2020-05-07 NOTE — TELEPHONE ENCOUNTER
Patient contacted regarding recent visit for viral symptoms. This Alessandro Boswell contacted the patient by telephone to perform post discharge call. Verified name and  with patient as identifiers. Provided introduction to self, and reason for call due to viral symptoms of infection and/or exposure to COVID-19. Patient presented to emergency department/flu clinic with complaints of viral symptoms/exposure to COVID. Patient reports symptoms are the same. Due to no new or worsening symptoms the RN CTN/ACM was not notified for escalation. Discussed exposure protocols and quarantine with CDC Guidelines What To Do If You Are Sick    Patient was given an opportunity for questions and concerns. Stay home except to get medical care    Separate yourself from other people and animals in your home    Call ahead before visiting your doctor    Wear a facemask    Cover your coughs and sneezes    Clean your hands often    Avoid sharing personal household items    Clean all high-touch surfaces everyday    Monitor your symptoms  Seek prompt medical attention if your illness is worsening (e.g., difficulty breathing). Before seeking care, call your healthcare provider and tell them that you have, or are being evaluated for, COVID-19. Put on a facemask before you enter the facility. These steps will help the healthcare provider's office to keep other people in the office or waiting room from getting infected or exposed. Ask your healthcare provider to call the local or [facility] health department. Persons who are placed under If you have a medical emergency and need to call 911, notify the dispatch personnel that you have, or are being evaluated for COVID-19. If possible, put on a facemask before emergency medical services arrive. The patient agrees to contact the [facility] exposure line [phone], local health department  and PCP office for questions related to their healthcare.  Author provided contact information for future reference. Patient/family/caregiver given information for [employer] and agrees to enroll yes  Patient's preferred e-mail: [email or web address]   Patient's preferred phone number: [phone]  Based on Loop alert triggers, patient will be contacted by nurse care manager for worsening symptoms.

## 2020-05-08 LAB
ORGANISM: ABNORMAL
URINE CULTURE, ROUTINE: ABNORMAL

## 2020-05-29 ENCOUNTER — HOSPITAL ENCOUNTER (OUTPATIENT)
Dept: GENERAL RADIOLOGY | Age: 80
Discharge: HOME | End: 2020-05-29
Payer: MEDICARE

## 2020-05-29 PROCEDURE — 77080 DXA BONE DENSITY AXIAL: CPT

## 2020-06-10 ENCOUNTER — OFFICE VISIT (OUTPATIENT)
Dept: CARDIOLOGY CLINIC | Age: 80
End: 2020-06-10
Payer: MEDICARE

## 2020-06-10 VITALS
TEMPERATURE: 97.8 F | HEIGHT: 63 IN | DIASTOLIC BLOOD PRESSURE: 82 MMHG | BODY MASS INDEX: 37.39 KG/M2 | WEIGHT: 211 LBS | SYSTOLIC BLOOD PRESSURE: 140 MMHG

## 2020-06-10 PROCEDURE — 99214 OFFICE O/P EST MOD 30 MIN: CPT | Performed by: INTERNAL MEDICINE

## 2020-06-10 PROCEDURE — 93000 ELECTROCARDIOGRAM COMPLETE: CPT | Performed by: INTERNAL MEDICINE

## 2020-06-10 RX ORDER — OXYBUTYNIN CHLORIDE 10 MG/1
10 TABLET, EXTENDED RELEASE ORAL DAILY
COMMUNITY
Start: 2020-05-30

## 2020-06-10 NOTE — PROGRESS NOTES
CC: Valve disease, palpitations  HPI: 67 y.o. here for f/u. Has sig luna. Says it's worse than it was last year. Thinks it is worse since she started taking her new bladder medicine; thinks that is a known side effect from it. She stopped the medication a couple days ago. No angina. Had 1 episode of cp. The sob is for months; worse now. Told her PCP and wanted to see if getting off the medicine helped. Of note, had sob last year and had neg stress test.      Past Medical History:   Diagnosis Date    Arthritis     Chronic back pain     Dizziness     Nosebleed     Overactive bladder     Rash      Past Surgical History:   Procedure Laterality Date    APPENDECTOMY      BACK SURGERY      KNEE SURGERY      bilat    NECK SURGERY       Social History     Tobacco Use    Smoking status: Never Smoker    Smokeless tobacco: Never Used   Substance Use Topics    Alcohol use: Not Currently     Frequency: Never    Drug use: Never     No Known Allergies    Family History   Problem Relation Age of Onset    Diabetes Mother     Heart Disease Father     Diabetes Father     Depression Maternal Grandmother     Heart Disease Paternal Grandfather      Review of Systems   General: No fevers, chills, fatigue, or night sweats. HEENT: No blurry or decreased vision. No changes in hearing, nasal discharge or sore throat. Cardiovascular: See HPI. No cramping in legs or buttocks when walking. Respiratory: No cough, hemoptysis, or wheezing. No history of asthma. Gastrointestinal: No abdominal pain, hematochezia, melana, or history of GI ulcers. Genito-Urinary: No dysuria or hematuria. No urgency or polyuria. Has \"overactive bladder. \"  Musculoskeletal: No complaints of joint pain, joint swelling or muscular weakness/soreness. Neurological: No dizziness or headaches. No numbness/tingling, speech problems or weakness. No history of a stroke or TIA.    Psychological: No anxiety or depression  Hematological and

## 2020-06-23 ENCOUNTER — TELEPHONE (OUTPATIENT)
Dept: CARDIOLOGY CLINIC | Age: 80
End: 2020-06-23

## 2020-06-23 NOTE — TELEPHONE ENCOUNTER
The patient called stating she is taking a new medication Budesonide 3 capsules starting on May 9th for 6 weeks, 2 capsules starting on June 20th for 4 weeks, and 1 capsule starting on July 4th. The patient states that she had a CT done at [facility] on 04/22/20. The patient states that Dr. Reyes Ordoñez ordered a CT on 06/10/20. The patient wants to know if she still needs to get the CT since she had one in April. Please call the patient at [phone] to advise.

## 2020-07-30 ENCOUNTER — APPOINTMENT (OUTPATIENT)
Dept: CT IMAGING | Age: 80
End: 2020-07-30
Payer: MEDICARE

## 2020-07-30 ENCOUNTER — HOSPITAL ENCOUNTER (EMERGENCY)
Age: 80
Discharge: HOME | End: 2020-07-30
Attending: EMERGENCY MEDICINE
Payer: MEDICARE

## 2020-07-30 VITALS
DIASTOLIC BLOOD PRESSURE: 65 MMHG | HEART RATE: 63 BPM | TEMPERATURE: 97.8 F | RESPIRATION RATE: 17 BRPM | OXYGEN SATURATION: 100 % | SYSTOLIC BLOOD PRESSURE: 136 MMHG

## 2020-07-30 LAB
A/G RATIO: 1.6 (ref 1.1–2.2)
ALBUMIN SERPL-MCNC: 4.1 G/DL (ref 3.4–5)
ALP BLD-CCNC: 53 U/L (ref 40–129)
ALT SERPL-CCNC: 19 U/L (ref 10–40)
ANION GAP SERPL CALCULATED.3IONS-SCNC: 10 MMOL/L (ref 3–16)
AST SERPL-CCNC: 42 U/L (ref 15–37)
BACTERIA: ABNORMAL /HPF
BASOPHILS ABSOLUTE: 0.1 K/UL (ref 0–0.2)
BASOPHILS RELATIVE PERCENT: 1 %
BILIRUB SERPL-MCNC: 1.1 MG/DL (ref 0–1)
BILIRUBIN URINE: NEGATIVE
BLOOD, URINE: ABNORMAL
BUN BLDV-MCNC: 20 MG/DL (ref 7–20)
CALCIUM SERPL-MCNC: 9.3 MG/DL (ref 8.3–10.6)
CHLORIDE BLD-SCNC: 103 MMOL/L (ref 99–110)
CLARITY: ABNORMAL
CO2: 23 MMOL/L (ref 21–32)
COLOR: YELLOW
CREAT SERPL-MCNC: 0.8 MG/DL (ref 0.6–1.2)
EOSINOPHILS ABSOLUTE: 0.1 K/UL (ref 0–0.6)
EOSINOPHILS RELATIVE PERCENT: 1.3 %
EPITHELIAL CELLS, UA: ABNORMAL /HPF (ref 0–5)
GFR AFRICAN AMERICAN: >60
GFR NON-AFRICAN AMERICAN: >60
GLOBULIN: 2.6 G/DL
GLUCOSE BLD-MCNC: 108 MG/DL (ref 70–99)
GLUCOSE URINE: NEGATIVE MG/DL
HCT VFR BLD CALC: 41.5 % (ref 36–48)
HEMOGLOBIN: 13.9 G/DL (ref 12–16)
KETONES, URINE: ABNORMAL MG/DL
LACTIC ACID: 1.3 MMOL/L (ref 0.4–2)
LEUKOCYTE ESTERASE, URINE: ABNORMAL
LYMPHOCYTES ABSOLUTE: 1.2 K/UL (ref 1–5.1)
LYMPHOCYTES RELATIVE PERCENT: 21.1 %
MCH RBC QN AUTO: 32.2 PG (ref 26–34)
MCHC RBC AUTO-ENTMCNC: 33.4 G/DL (ref 31–36)
MCV RBC AUTO: 96.4 FL (ref 80–100)
MICROSCOPIC EXAMINATION: YES
MONOCYTES ABSOLUTE: 0.5 K/UL (ref 0–1.3)
MONOCYTES RELATIVE PERCENT: 7.9 %
MUCUS: ABNORMAL /LPF
NEUTROPHILS ABSOLUTE: 4.1 K/UL (ref 1.7–7.7)
NEUTROPHILS RELATIVE PERCENT: 68.7 %
NITRITE, URINE: NEGATIVE
PDW BLD-RTO: 13.9 % (ref 12.4–15.4)
PH UA: 6 (ref 5–8)
PLATELET # BLD: 187 K/UL (ref 135–450)
PMV BLD AUTO: 8.6 FL (ref 5–10.5)
POTASSIUM REFLEX MAGNESIUM: 5.2 MMOL/L (ref 3.5–5.1)
PROTEIN UA: NEGATIVE MG/DL
RBC # BLD: 4.3 M/UL (ref 4–5.2)
RBC UA: ABNORMAL /HPF (ref 0–4)
RENAL EPITHELIAL, UA: ABNORMAL /HPF (ref 0–1)
SODIUM BLD-SCNC: 136 MMOL/L (ref 136–145)
SPECIFIC GRAVITY UA: 1.02 (ref 1–1.03)
TOTAL PROTEIN: 6.7 G/DL (ref 6.4–8.2)
URINE REFLEX TO CULTURE: YES
URINE TYPE: ABNORMAL
UROBILINOGEN, URINE: 0.2 E.U./DL
WBC # BLD: 5.9 K/UL (ref 4–11)
WBC UA: ABNORMAL /HPF (ref 0–5)

## 2020-07-30 PROCEDURE — 96374 THER/PROPH/DIAG INJ IV PUSH: CPT

## 2020-07-30 PROCEDURE — 36415 COLL VENOUS BLD VENIPUNCTURE: CPT

## 2020-07-30 PROCEDURE — 6360000004 HC RX CONTRAST MEDICATION: Performed by: STUDENT IN AN ORGANIZED HEALTH CARE EDUCATION/TRAINING PROGRAM

## 2020-07-30 PROCEDURE — 6360000002 HC RX W HCPCS: Performed by: STUDENT IN AN ORGANIZED HEALTH CARE EDUCATION/TRAINING PROGRAM

## 2020-07-30 PROCEDURE — 83605 ASSAY OF LACTIC ACID: CPT

## 2020-07-30 PROCEDURE — 99284 EMERGENCY DEPT VISIT MOD MDM: CPT

## 2020-07-30 PROCEDURE — 80053 COMPREHEN METABOLIC PANEL: CPT

## 2020-07-30 PROCEDURE — 2580000003 HC RX 258: Performed by: STUDENT IN AN ORGANIZED HEALTH CARE EDUCATION/TRAINING PROGRAM

## 2020-07-30 PROCEDURE — 85025 COMPLETE CBC W/AUTO DIFF WBC: CPT

## 2020-07-30 PROCEDURE — 87086 URINE CULTURE/COLONY COUNT: CPT

## 2020-07-30 PROCEDURE — 81001 URINALYSIS AUTO W/SCOPE: CPT

## 2020-07-30 PROCEDURE — 74177 CT ABD & PELVIS W/CONTRAST: CPT

## 2020-07-30 RX ORDER — SODIUM CHLORIDE, SODIUM LACTATE, POTASSIUM CHLORIDE, AND CALCIUM CHLORIDE .6; .31; .03; .02 G/100ML; G/100ML; G/100ML; G/100ML
500 INJECTION, SOLUTION INTRAVENOUS ONCE
Status: COMPLETED | OUTPATIENT
Start: 2020-07-30 | End: 2020-07-30

## 2020-07-30 RX ORDER — KETOROLAC TROMETHAMINE 30 MG/ML
15 INJECTION, SOLUTION INTRAMUSCULAR; INTRAVENOUS ONCE
Status: COMPLETED | OUTPATIENT
Start: 2020-07-30 | End: 2020-07-30

## 2020-07-30 RX ORDER — CEPHALEXIN 500 MG/1
500 CAPSULE ORAL 2 TIMES DAILY
Qty: 14 CAPSULE | Refills: 0 | Status: SHIPPED | OUTPATIENT
Start: 2020-07-30 | End: 2020-08-06

## 2020-07-30 RX ADMIN — KETOROLAC TROMETHAMINE 15 MG: 30 INJECTION, SOLUTION INTRAMUSCULAR at 12:22

## 2020-07-30 RX ADMIN — SODIUM CHLORIDE, POTASSIUM CHLORIDE, SODIUM LACTATE AND CALCIUM CHLORIDE 500 ML: 600; 310; 30; 20 INJECTION, SOLUTION INTRAVENOUS at 12:23

## 2020-07-30 RX ADMIN — IOPAMIDOL 80 ML: 755 INJECTION, SOLUTION INTRAVENOUS at 13:18

## 2020-07-30 ASSESSMENT — PAIN DESCRIPTION - LOCATION: LOCATION: ABDOMEN;FLANK

## 2020-07-30 ASSESSMENT — ENCOUNTER SYMPTOMS
PHOTOPHOBIA: 0
WHEEZING: 0
NAUSEA: 0
RECTAL PAIN: 0
BLOOD IN STOOL: 0
VOMITING: 0
CONSTIPATION: 0
ABDOMINAL PAIN: 1
SHORTNESS OF BREATH: 0
SORE THROAT: 0
EYE PAIN: 0

## 2020-07-30 ASSESSMENT — PAIN SCALES - GENERAL
PAINLEVEL_OUTOF10: 10
PAINLEVEL_OUTOF10: 7

## 2020-07-30 ASSESSMENT — PAIN DESCRIPTION - ONSET: ONSET: GRADUAL

## 2020-07-30 ASSESSMENT — PAIN DESCRIPTION - DESCRIPTORS: DESCRIPTORS: SHARP

## 2020-07-30 ASSESSMENT — PAIN DESCRIPTION - PAIN TYPE: TYPE: ACUTE PAIN

## 2020-07-30 ASSESSMENT — PAIN DESCRIPTION - ORIENTATION: ORIENTATION: RIGHT

## 2020-07-30 ASSESSMENT — PAIN DESCRIPTION - FREQUENCY: FREQUENCY: CONTINUOUS

## 2020-07-30 ASSESSMENT — PAIN DESCRIPTION - PROGRESSION: CLINICAL_PROGRESSION: GRADUALLY WORSENING

## 2020-07-30 ASSESSMENT — PAIN - FUNCTIONAL ASSESSMENT: PAIN_FUNCTIONAL_ASSESSMENT: PREVENTS OR INTERFERES SOME ACTIVE ACTIVITIES AND ADLS

## 2020-07-30 NOTE — ED NOTES
Discharge instructions and prescriptions reviewed with patient. Pt verbalized understanding. IV d/c. Pt tolerated well. Pt discharged home with family.        Almita Mcmullen RN  07/30/20 [id number]

## 2020-07-30 NOTE — ED PROVIDER NOTES
[facility]ending [facility] to [facility] ENCOUNTER          EM RESIDENT NOTE       Date of evaluation: 7/30/2020    Chief Complaint     Abdominal Pain and Flank Pain      History of Present Illness     Edgar Brooks is a 79 y.o. female who presents with a chief complaint of Abdominal Pain and Flank Pain    Past medical history notable for: Appendectomy at age 16, lumbar spinal surgery in 2008, recent colonoscopy less than 1 month ago with evidence of colitis    The patient presents with 4 days of gradually increasing right-sided lower abdominal pain and right-sided lower back pain. She states that the onset of this pain was insidious and there was no trauma. She states that the pain has been increasing when she raises her right leg, however the pain does not radiate down her right leg. She attempted to lie down for 2 days, however the pain has continued to get worse. She also took an old opioid analgesic medication last night but that did not help. She has never had anything like this before. She has no fevers or chills. She denies dysuria, hematuria, rectal bleeding and rectal pain. She denies vaginal bleeding and pain with intercourse. She denies rash. Her last bowel movement was 1 day ago. Patient is not on blood thinners. Other than stated above, no additional aggravating or alleviating factors are noted. All nursing notes and triage notes were appropriately reviewed in the course of the creation of this note. Past Medical, Surgical, Family, and Social History     She has a past medical history of Arthritis, Chronic back pain, Dizziness, Nosebleed, Overactive bladder, and Rash. She has a past surgical history that includes Neck surgery; back surgery; knee surgery; and Appendectomy. Her family history includes Depression in her maternal grandmother; Diabetes in her father and mother; Heart Disease in her father and paternal grandfather. She reports that she has never smoked.  She has never used smokeless tobacco. She reports previous alcohol use. She reports that she does not use drugs. Review of Systems     Review of Systems   Constitutional: Negative for chills and fever. HENT: Negative for ear pain and sore throat. Eyes: Negative for photophobia and pain. Respiratory: Negative for shortness of breath and wheezing. Cardiovascular: Negative for chest pain and leg swelling. Gastrointestinal: Positive for abdominal pain. Negative for blood in stool, constipation, nausea, rectal pain and vomiting. Genitourinary: Negative for difficulty urinating, dysuria, flank pain, hematuria, menstrual problem, vaginal bleeding, vaginal discharge and vaginal pain. Musculoskeletal: Negative for joint swelling. Skin: Negative for rash. Neurological: Negative for seizures, syncope and weakness. Psychiatric/Behavioral: Negative. Medications     Previous Medications    ASPIRIN 81 MG CHEWABLE TABLET    Take 81 mg by mouth daily    ATORVASTATIN (LIPITOR) 20 MG TABLET    Take 20 mg by mouth daily. CHOLECALCIFEROL (VITAMIN D3) 50 MCG (2000 UT) CAPS    Take by mouth    MULTIPLE VITAMINS-MINERALS (CENTRUM SILVER ULTRA WOMENS) TABS    Take by mouth    OMEGA-3 FATTY ACIDS (FISH OIL) 1000 MG CAPS    Take 2,000 mg by mouth daily    OXYBUTYNIN (DITROPAN-XL) 10 MG EXTENDED RELEASE TABLET    Take 10 mg by mouth daily       Allergies     She has No Known Allergies. Physical Exam     INITIAL VITALS:   BP: (!) 124/56, Temp: 97.8 °F (36.6 °C), Pulse: 63, Resp: 17, SpO2: 93 %     Physical Exam  Vitals signs and nursing note reviewed. Constitutional:       General: She is not in acute distress. HENT:      Head: Normocephalic and atraumatic. Right Ear: External ear normal.      Left Ear: External ear normal.      Nose: Nose normal.      Mouth/Throat:      Mouth: Mucous membranes are moist.   Eyes:      Extraocular Movements: Extraocular movements intact.       Pupils: Pupils are equal, round, and reactive to light. Neck:      Musculoskeletal: Normal range of motion and neck supple. No neck rigidity. Cardiovascular:      Rate and Rhythm: Regular rhythm. Heart sounds: Normal heart sounds. No murmur. No friction rub. No gallop. Pulmonary:      Effort: Pulmonary effort is normal. No respiratory distress. Breath sounds: Normal breath sounds. No wheezing or rhonchi. Abdominal:      Palpations: Abdomen is soft. Tenderness: There is abdominal tenderness. There is no right CVA tenderness, left CVA tenderness, guarding or rebound. Musculoskeletal: Normal range of motion. General: No deformity. Right lower leg: No edema. Left lower leg: No edema. Comments: Pain with active flexion of right hip. No pain with passive flexion. Skin:     General: Skin is warm and dry. Findings: No rash. Neurological:      General: No focal deficit present. Mental Status: She is alert and oriented to person, place, and time. Cranial Nerves: No cranial nerve deficit. Sensory: No sensory deficit. Motor: No weakness. Psychiatric:         Mood and Affect: Mood normal.         Behavior: Behavior normal.          Diagnostic Results     LABS AND RADIOLOGY  Please see electronic medical record for any tests performed in the ED. All results were reviewed by me during the course of the patient's emergency department stay. CT ABDOMEN PELVIS W IV CONTRAST Additional Contrast? None   Final Result      1. No acute findings. 2.  Renal cysts. 3.  Stable right lower lobe nodules. EKG   Not ordered    ED BEDSIDE ULTRASOUND:  none    RECENT VITALS:  BP: 136/65, Temp: 97.8 °F (36.6 °C), Pulse: 63,Resp: 17, SpO2: 100 %     Procedures     none    ED Course   Nursing Notes, Past Medical Hx, Past Surgical Hx, Social Hx, Allergies, and Family Hx were reviewed.     The patient was given the following medications:  Medications   ketorolac (TORADOL) injection 15 mg (15 mg Intravenous Given 7/30/20 1222)   lactated ringers bolus (0 mLs Intravenous Stopped 7/30/20 1404)   iopamidol (ISOVUE-370) 76 % injection 80 mL (80 mLs Intravenous Given 7/30/20 1318)       CONSULTS:  None    MEDICAL DECISION MAKING / ASSESSMENT / Madan Sheldon is a 79 y.o. female with a history and presentation as described above. This patient was also evaluated by the attending physician. All care plans werediscussed and agreed upon. Upon presentation, the patient was alert, oriented, cooperative and in no apparent distress. Vital signs were unremarkable. Based on this initial presentation and noted historical factors above, differential diagnosis included perforated viscus, peritonitis, nephrolithiasis, UTI, ovarian pathology, colitis, diverticulitis, obstruction, cholecystitis, ruptured AAA, mesenteric ischemia, psoas abscess or hematoma, etc.     ED Course as of Jul 30 1514   Thu Jul 30, 2020   1133 Patient's initial blood pressure notable for a mildly low diastolic at 56. [AF]   1666 Patient given Toradol for pain control. Will reassess. [AF]   6811 Patient's urine with large leuk esterase, 10-20 white blood cells, and 4+ bacteria. In absence of other findings, this is likely the cause of her abdominal pain. Culture will be sent, but patient will be treated empirically in the meantime.    [AF]   1245 Lactic Acid: 1.3 [AF]   1245 Lactic Acid, Plasma:    Lactic Acid 1.3 [AF]   5547 Metabolic panel and transaminases largely unremarkable. Comprehensive Metabolic Panel w/ Reflex to MG(!):    Sodium 136   Potassium 5.2(!)   Chloride 103   CO2 23   Anion Gap 10   Glucose 108(!)   BUN 20   Creatinine 0.8   GFR Non- >60   GFR African American >60   Calcium 9.3   Total Protein 6.7   Albumin 4.1   Albumin/Globulin Ratio 1.6   Bilirubin 1.1(!)   Alk Phos 53   ALT 19   AST 42(!)   Globulin 2.6 [AF]   1418 Abdominal CT without any acute findings.   No evidence of ureterolithiasis. No intraperitoneal peritoneal air. No evidence of psoas abscess or hematoma. CT ABDOMEN PELVIS W IV CONTRAST Additional Contrast? None [AF]   1441 The patient does not have any perinephric stranding or CVA tenderness on exam.  Pyelonephritis is unlikely. However, the patient does have a rather large right renal cyst.  This is likely noncontributory but in rare cases can cause pain. I suspect that the most likely cause of this patient's pain is related to her bacterial urinary tract infection. She may also have additional pain coming from her lumbar spine which was previously repaired at L4-L5.    [AF]      ED Course User Index  [AF] Carmencita Ramos MD     The patient was discharged with a prescription for keflex for her UTI. Plan: At this time, the patient was deemed appropriate for discharge. Discharge instructions, including strict return precautions for new or worsening symptoms concerning to the patient, were provided. All of her questions were answered satisfactorily, and she was subsequently sent home in stable condition. The patient is instructed to return to the emergency department should her symptoms worsen or any concern she believes warrants acute physician evaluation. Yifan Calderon MD, PGY-1   Emergency Medicine    Clinical Impression     1.  Urinary tract infection without hematuria, site unspecified        Disposition     PATIENT REFERRED TO:  Viktor [place], [address] [address]  [phone]    Schedule an appointment as soon as possible for a visit in 1 week  As needed      DISCHARGE MEDICATIONS:  New Prescriptions    CEPHALEXIN (KEFLEX) 500 MG CAPSULE    Take 1 capsule by mouth 2 times daily for 7 days       Luciana Shea MD  Resident  07/30/20 9599

## 2020-07-30 NOTE — ED TRIAGE NOTES
Pt came in for complaints of R abdominal pain rating it a 10 out of 10 pain that radiates to her right flank/back. Pain increases with movement on R leg. Denies urinary symptoms besides urine frequency but she does have over active bladder.

## 2020-07-30 NOTE — ED PROVIDER NOTES
ED Attending Attestation Note     Date of evaluation: 7/30/2020    This patient was seen by the resident. I have seen and examined the patient, agree with the workup, evaluation, management and diagnosis. The care plan has been discussed. My assessment reveals patient here with abdominal pain and flank pain right-sided that seems worse when she stands on her right leg and raises her leg. Pain has been present for 4 days. Evaluation including CT scan did not reveal any identifiable cause for her pain. She was noted to have a bladder infection and will be started on antibiotics.      Daphne Meadows MD  07/30/20 0580

## 2020-07-31 LAB — URINE CULTURE, ROUTINE: NORMAL

## 2020-09-02 ENCOUNTER — TELEPHONE (OUTPATIENT)
Dept: ENT CLINIC | Age: 80
End: 2020-09-02

## 2020-09-02 NOTE — TELEPHONE ENCOUNTER
Please call pt she would not take first available next week . .she is in pain \"from ears down to throat\" please advise

## 2020-09-03 ENCOUNTER — TELEPHONE (OUTPATIENT)
Dept: ENT CLINIC | Age: 80
End: 2020-09-03

## 2020-09-04 ENCOUNTER — OFFICE VISIT (OUTPATIENT)
Dept: ENT CLINIC | Age: 80
End: 2020-09-04
Payer: MEDICARE

## 2020-09-04 VITALS
DIASTOLIC BLOOD PRESSURE: 61 MMHG | TEMPERATURE: 96.6 F | HEART RATE: 63 BPM | WEIGHT: 213.4 LBS | BODY MASS INDEX: 35.56 KG/M2 | HEIGHT: 65 IN | SYSTOLIC BLOOD PRESSURE: 134 MMHG

## 2020-09-04 PROCEDURE — 99214 OFFICE O/P EST MOD 30 MIN: CPT | Performed by: OTOLARYNGOLOGY

## 2020-09-04 RX ORDER — NAPROXEN 500 MG/1
500 TABLET ORAL 2 TIMES DAILY WITH MEALS
Qty: 60 TABLET | Refills: 2 | Status: SHIPPED | OUTPATIENT
Start: 2020-09-04 | End: 2020-10-04

## 2020-09-04 NOTE — PROGRESS NOTES
distress  RESPIRATORY: No stridor. COMMUNICATION :  Normal voice  MENTAL STATUS: Alert and oriented x3  HEAD AND FACE: No skin lesions of the face. EXTERNAL EARS AND NOSE: The external ears and nasal pyramid are normal.  FACIAL MUSCLES: All branches of the facial nerve intact. FACE PALPATION: Zygomatic arches and orbital rims are intact. No tenderness over the sinuses. There is tenderness over both temporomandibular joints. EXTRAOCULAR MUSCLES: Intact with full range of motion. OTOSCOPY:  Normal tympanic membranes, middle ear spaces, and external auditory canals. TUNING FORKS:  Rinne ++ Cheatham midline at 512 Hz  INTRANASAL:  Septum midline, turbinates normal, meati clear. LIPS, TEETH, GINGIVA:  Normal mucosa  PHARYNX:  Normal  NECK:  No masses. LYMPH NODES: No cervical lymphadenopathy. SALIVARY GLANDS: Parotid and submandibular glands normal.  THYROID: No goiter or thyroid nodules palpable. 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 PLAN: Naproxen 500 mg prescribed to be taken twice daily with meals. FOLLOW-UP: As needed.

## 2021-03-08 ENCOUNTER — OFFICE VISIT (OUTPATIENT)
Dept: ENT CLINIC | Age: 81
End: 2021-03-08
Payer: MEDICARE

## 2021-03-08 VITALS
TEMPERATURE: 96.6 F | BODY MASS INDEX: 36.02 KG/M2 | HEART RATE: 62 BPM | HEIGHT: 64 IN | WEIGHT: 211 LBS | DIASTOLIC BLOOD PRESSURE: 79 MMHG | SYSTOLIC BLOOD PRESSURE: 146 MMHG

## 2021-03-08 DIAGNOSIS — J34.89 NASAL VESTIBULITIS: Primary | ICD-10-CM

## 2021-03-08 PROCEDURE — 99214 OFFICE O/P EST MOD 30 MIN: CPT | Performed by: OTOLARYNGOLOGY

## 2021-03-08 NOTE — PROGRESS NOTES
FOLLOW UP VISIT:    CHIEF COMPLAINT: Left nasal pain. INTERIM HISTORY: 6 week history of pain left naris. Some crusting, some bleeding. Some nasal obstruction. PAST MEDICAL HISTORY:   Social History     Tobacco Use   Smoking Status Never Smoker   Smokeless Tobacco Never Used                                                    Social History     Substance and Sexual Activity   Alcohol Use Not Currently    Frequency: Never                                                    Current Outpatient Medications:     oxybutynin (DITROPAN-XL) 10 MG extended release tablet, Take 10 mg by mouth daily, Disp: , Rfl:     Cholecalciferol (VITAMIN D3) 50 MCG (2000 UT) CAPS, Take by mouth, Disp: , Rfl:     Multiple Vitamins-Minerals (CENTRUM SILVER ULTRA WOMENS) TABS, Take by mouth, Disp: , Rfl:     aspirin 81 MG chewable tablet, Take 81 mg by mouth daily, Disp: , Rfl:     Omega-3 Fatty Acids (FISH OIL) 1000 MG CAPS, Take 2,000 mg by mouth daily, Disp: , Rfl:     atorvastatin (LIPITOR) 20 MG tablet, Take 20 mg by mouth daily. , Disp: , Rfl:     naproxen (NAPROSYN) 500 MG tablet, Take 1 tablet by mouth 2 times daily (with meals), Disp: 60 tablet, Rfl: 2                                                 Past Medical History:   Diagnosis Date    Arthritis     Chronic back pain     Dizziness     Nosebleed     Overactive bladder     Rash                                                     Past Surgical History:   Procedure Laterality Date    APPENDECTOMY      BACK SURGERY      KNEE SURGERY      bilat    NECK SURGERY         FAMILY HISTORY: Family history reviewed and except as pertinent to the interim history is not contributory. REVIEW OF SYSTEMS:  All pertinent positive and negative findings included in HPI. Otherwise, all other systems are reviewed and are negative    PHYSICAL EXAMINATION:   GENERAL: wdwn- no acute distress  RESPIRATORY: No stridor.   COMMUNICATION :  Normal voice  MENTAL STATUS: Alert and oriented x3  HEAD AND FACE: No skin lesions of the face. EXTERNAL EARS AND NOSE: The external ears and nasal pyramid are normal.  FACIAL MUSCLES: All branches of the facial nerve intact. FACE PALPATION: Zygomatic arches and orbital rims are intact. No tenderness over the sinuses. EXTRAOCULAR MUSCLES: Intact with full range of motion. OTOSCOPY:  Normal tympanic membranes, middle ear spaces, and external auditory canals. TUNING FORKS:  Rinne ++ Cheatham midline at 512 Hz  INTRANASAL:  Septum midline, turbinates normal, meati clear. Eschar left lateral nasal vestibule  LIPS, TEETH, GINGIVA:  Normal mucosa  PHARYNX:  Normal  NECK:  No masses. LYMPH NODES: No cervical lymphadenopathy. SALIVARY GLANDS: Parotid and submandibular glands normal.  THYROID: No goiter or thyroid nodules palpable. IMPRESSION: Nasal vestibulitis    PLAN: Mupirocin ointment prescribed to be applied 3 X daily for 1 week.   FOLLOW-UP: As needed

## 2022-05-25 ENCOUNTER — OFFICE VISIT (OUTPATIENT)
Dept: ENT CLINIC | Age: 82
End: 2022-05-25
Payer: MEDICARE

## 2022-05-25 VITALS — BODY MASS INDEX: 36.02 KG/M2 | WEIGHT: 211 LBS | HEIGHT: 64 IN

## 2022-05-25 DIAGNOSIS — H92.02 REFERRED OTALGIA OF LEFT EAR: ICD-10-CM

## 2022-05-25 DIAGNOSIS — J34.89 NASAL OBSTRUCTION: Primary | ICD-10-CM

## 2022-05-25 DIAGNOSIS — M26.642 ARTHRITIS OF LEFT TEMPOROMANDIBULAR JOINT: ICD-10-CM

## 2022-05-25 DIAGNOSIS — J34.3 HYPERTROPHY OF NASAL TURBINATES: ICD-10-CM

## 2022-05-25 PROCEDURE — 99213 OFFICE O/P EST LOW 20 MIN: CPT | Performed by: OTOLARYNGOLOGY

## 2022-05-25 PROCEDURE — 1123F ACP DISCUSS/DSCN MKR DOCD: CPT | Performed by: OTOLARYNGOLOGY

## 2022-05-25 NOTE — PROGRESS NOTES
FOLLOW UP VISIT:    CHIEF COMPLAINT: Nasal obstruction    INTERIM HISTORY: Nasal obstruction of six weeks duration. Bilateral.  Worse when recumbent at night. Also has pain left ear. Both ears feel full.  2 months duration. PAST MEDICAL HISTORY:   Social History     Tobacco Use   Smoking Status Never Smoker   Smokeless Tobacco Never Used                                                    Social History     Substance and Sexual Activity   Alcohol Use Not Currently                                                    Current Outpatient Medications:     oxybutynin (DITROPAN-XL) 10 MG extended release tablet, Take 10 mg by mouth daily, Disp: , Rfl:     Cholecalciferol (VITAMIN D3) 50 MCG (2000 UT) CAPS, Take by mouth, Disp: , Rfl:     Multiple Vitamins-Minerals (CENTRUM SILVER ULTRA WOMENS) TABS, Take by mouth, Disp: , Rfl:     aspirin 81 MG chewable tablet, Take 81 mg by mouth daily, Disp: , Rfl:     Omega-3 Fatty Acids (FISH OIL) 1000 MG CAPS, Take 2,000 mg by mouth daily, Disp: , Rfl:     atorvastatin (LIPITOR) 20 MG tablet, Take 20 mg by mouth daily. , Disp: , Rfl:     naproxen (NAPROSYN) 500 MG tablet, Take 1 tablet by mouth 2 times daily (with meals), Disp: 60 tablet, Rfl: 2                                                 Past Medical History:   Diagnosis Date    Arthritis     Chronic back pain     Dizziness     Nosebleed     Overactive bladder     Rash                                                     Past Surgical History:   Procedure Laterality Date    APPENDECTOMY      BACK SURGERY      KNEE SURGERY      bilat    NECK SURGERY         FAMILY HISTORY: Family history reviewed and except as pertinent to the interim history is not contributory. REVIEW OF SYSTEMS:  All pertinent positive and negative findings included in HPI. Otherwise, all other systems are reviewed and are negative    PHYSICAL EXAMINATION:   GENERAL: wdwn- no acute distress  RESPIRATORY: No stridor.   COMMUNICATION : Normal voice  MENTAL STATUS: Alert and oriented x3  HEAD AND FACE: No skin lesions of the face. EXTERNAL EARS AND NOSE: The external ears and nasal pyramid are normal.  FACIAL MUSCLES: All branches of the facial nerve intact. FACE PALPATION: Zygomatic arches and orbital rims are intact. No tenderness over the sinuses. Tenderness on palpation of the left temporomandibular joint  EXTRAOCULAR MUSCLES: Intact with full range of motion. OTOSCOPY:  Normal tympanic membranes, middle ear spaces, and external auditory canals. TUNING FORKS:  Rinne ++ Cheatham midline at 512 Hz  INTRANASAL:  Septum midline, turbinates enlarged, meati clear. No nasal polyps. LIPS, TEETH, GINGIVA:  Normal mucosa  PHARYNX:  Normal  NECK:  No masses. LYMPH NODES: No cervical lymphadenopathy. SALIVARY GLANDS: Parotid and submandibular glands normal.  THYROID: No goiter or thyroid nodules palpable. IMPRESSION: Suspect that the nasal obstruction is due to turbinate hypertrophy. Left ear pain is referred from the temporomandibular joint. PLAN: Trial of Afrin spray for several nights to see if decongesting the turbinate improves the nasal airway.     FOLLOW-UP: By phone to report the effectiveness of the Afrin trial.

## 2022-10-14 ENCOUNTER — OFFICE VISIT (OUTPATIENT)
Dept: ENT CLINIC | Age: 82
End: 2022-10-14
Payer: MEDICARE

## 2022-10-14 DIAGNOSIS — H61.23 BILATERAL IMPACTED CERUMEN: ICD-10-CM

## 2022-10-14 DIAGNOSIS — J34.89 NASAL OBSTRUCTION: Primary | ICD-10-CM

## 2022-10-14 DIAGNOSIS — R51.9 LEFT FACIAL PAIN: ICD-10-CM

## 2022-10-14 PROCEDURE — 99213 OFFICE O/P EST LOW 20 MIN: CPT | Performed by: OTOLARYNGOLOGY

## 2022-10-14 PROCEDURE — 1123F ACP DISCUSS/DSCN MKR DOCD: CPT | Performed by: OTOLARYNGOLOGY

## 2022-10-14 PROCEDURE — 69210 REMOVE IMPACTED EAR WAX UNI: CPT | Performed by: OTOLARYNGOLOGY

## 2022-10-14 NOTE — PROGRESS NOTES
No stridor. COMMUNICATION :  Normal voice  MENTAL STATUS: Alert and oriented x3  HEAD AND FACE: No skin lesions of the face. EXTERNAL EARS AND NOSE: The external ears and nasal pyramid are normal.  FACIAL MUSCLES: All branches of the facial nerve intact. FACE PALPATION: Zygomatic arches and orbital rims are intact. No tenderness over the sinuses. EXTRAOCULAR MUSCLES: Intact with full range of motion. OTOSCOPY: Cerumen occludes both ear canals. TUNING FORKS:  Rinne ++ Cheatham midline at 512 Hz  INTRANASAL:  Septum midline, turbinates normal, meati clear. LIPS, TEETH, GINGIVA:  Normal mucosa  PHARYNX:  Normal  NECK:  No masses. LYMPH NODES: No cervical lymphadenopathy. SALIVARY GLANDS: Parotid and submandibular glands normal.  THYROID: No goiter or thyroid nodules palpable. IMPRESSION: Impacted cerumen, left-sided facial pain. PLAN: Cerumen removed from both ears using curettes. The tympanic membranes and middle ear spaces are normal.  CT imaging of the paranasal sinuses ordered. FOLLOW-UP: After imaging.

## 2022-10-21 ENCOUNTER — HOSPITAL ENCOUNTER (OUTPATIENT)
Dept: CT IMAGING | Age: 82
Discharge: HOME | End: 2022-10-21
Payer: MEDICARE

## 2022-10-21 DIAGNOSIS — R51.9 LEFT FACIAL PAIN: ICD-10-CM

## 2022-10-21 DIAGNOSIS — J34.89 NASAL OBSTRUCTION: ICD-10-CM

## 2022-10-21 PROCEDURE — 70486 CT MAXILLOFACIAL W/O DYE: CPT

## 2022-11-03 ENCOUNTER — TELEPHONE (OUTPATIENT)
Dept: ENT CLINIC | Age: 82
End: 2022-11-03

## 2022-11-03 NOTE — TELEPHONE ENCOUNTER
Pt called to see if someone could go over her results with her from her facial scan. Please give her a call back when possible, thanks!

## 2022-11-07 NOTE — TELEPHONE ENCOUNTER
Reviewed CT. Sinuses are clear. Turbinate hypertrophy. The patient has nasal obstruction, left worse than right. Trial with Afrin relieves the obstruction. Patient will come to the office for another evaluation and to discuss options for dealing with the turbinate bones.

## 2022-11-16 ENCOUNTER — OFFICE VISIT (OUTPATIENT)
Dept: ENT CLINIC | Age: 82
End: 2022-11-16
Payer: MEDICARE

## 2022-11-16 VITALS
HEIGHT: 64 IN | SYSTOLIC BLOOD PRESSURE: 112 MMHG | BODY MASS INDEX: 37.22 KG/M2 | DIASTOLIC BLOOD PRESSURE: 63 MMHG | TEMPERATURE: 97.2 F | WEIGHT: 218 LBS | HEART RATE: 70 BPM

## 2022-11-16 DIAGNOSIS — J34.3 HYPERTROPHY OF NASAL TURBINATES: ICD-10-CM

## 2022-11-16 DIAGNOSIS — J34.89 NASAL OBSTRUCTION: Primary | ICD-10-CM

## 2022-11-16 DIAGNOSIS — R51.9 LEFT FACIAL PAIN: ICD-10-CM

## 2022-11-16 DIAGNOSIS — J34.2 DEVIATED NASAL SEPTUM: ICD-10-CM

## 2022-11-16 PROCEDURE — 99213 OFFICE O/P EST LOW 20 MIN: CPT | Performed by: OTOLARYNGOLOGY

## 2022-11-16 PROCEDURE — 31231 NASAL ENDOSCOPY DX: CPT | Performed by: OTOLARYNGOLOGY

## 2022-11-16 PROCEDURE — 1123F ACP DISCUSS/DSCN MKR DOCD: CPT | Performed by: OTOLARYNGOLOGY

## 2022-11-16 NOTE — PROGRESS NOTES
FOLLOW UP VISIT:    CHIEF COMPLAINT: Nasal obstruction    INTERIM HISTORY: Seen in the past for nasal obstruction, left worse than right, has some left facial pain. I imaged her paranasal sinuses and they were clear. I asked her to use Afrin spray to see if it relieved her symptoms and it did. PAST MEDICAL HISTORY:   Social History     Tobacco Use   Smoking Status Never   Smokeless Tobacco Never                                                    Social History     Substance and Sexual Activity   Alcohol Use Never                                                    Current Outpatient Medications:     Cholecalciferol (VITAMIN D3) 50 MCG (2000 UT) CAPS, Take by mouth, Disp: , Rfl:     Multiple Vitamins-Minerals (CENTRUM SILVER ULTRA WOMENS) TABS, Take by mouth, Disp: , Rfl:     aspirin 81 MG chewable tablet, Take 81 mg by mouth daily, Disp: , Rfl:     Omega-3 Fatty Acids (FISH OIL) 1000 MG CAPS, Take 2,000 mg by mouth daily, Disp: , Rfl:     atorvastatin (LIPITOR) 20 MG tablet, Take 20 mg by mouth daily. , Disp: , Rfl:                                                  Past Medical History:   Diagnosis Date    Arthritis     Chronic back pain     Dizziness     Nosebleed     Overactive bladder     Rash                                                     Past Surgical History:   Procedure Laterality Date    APPENDECTOMY      BACK SURGERY      KNEE SURGERY      bilat    NECK SURGERY         FAMILY HISTORY: Family history reviewed and except as pertinent to the interim history is not contributory. REVIEW OF SYSTEMS:  All pertinent positive and negative findings included in HPI. Otherwise, all other systems are reviewed and are negative    PHYSICAL EXAMINATION:   GENERAL: wdwn- no acute distress  RESPIRATORY: No stridor. COMMUNICATION :  Normal voice  MENTAL STATUS: Alert and oriented x3  HEAD AND FACE: No skin lesions of the face.   EXTERNAL EARS AND NOSE: The external ears and nasal pyramid are normal.  FACIAL MUSCLES: All branches of the facial nerve intact. FACE PALPATION: Zygomatic arches and orbital rims are intact. No tenderness over the sinuses. EXTRAOCULAR MUSCLES: Intact with full range of motion. INTRANASAL:  Septum midline, turbinates normal, meati clear. FIBEROPTIC NASAL ENDOSCOPY: Both nares examined with his 0 degree rigid nasal endoscope. There is turbinate hypertrophy bilateral.  There is a septal spur on the left side. IMPRESSION: Nasal obstruction due to deviated septum and turbinate hypertrophy. PLAN: After discussion with patient she is interested in having a septal reconstruction and partial resection of inferior turbinates. FOLLOW-UP: At surgery.

## 2022-12-06 ENCOUNTER — TELEPHONE (OUTPATIENT)
Dept: ENT CLINIC | Age: 82
End: 2022-12-06

## 2022-12-06 RX ORDER — CYCLOBENZAPRINE HCL 10 MG
TABLET ORAL AS NEEDED
COMMUNITY
Start: 2022-09-14

## 2022-12-06 RX ORDER — MIRABEGRON 25 MG/1
TABLET, FILM COATED, EXTENDED RELEASE ORAL
Status: ON HOLD | COMMUNITY
Start: 2022-10-12 | End: 2022-12-08 | Stop reason: ALTCHOICE

## 2022-12-06 RX ORDER — TRIMETHOPRIM 100 MG/1
TABLET ORAL
COMMUNITY
Start: 2022-09-13

## 2022-12-06 NOTE — PROGRESS NOTES
[facility] PRE-SURGICAL TESTING INSTRUCTIONS                      PRIOR TO PROCEDURE DATE:    1. PLEASE FOLLOW ANY INSTRUCTIONS GIVEN TO YOU PER YOUR SURGEON. 2. Arrange for someone to drive you home and be with you for the first 24 hours after discharge for your safety after your procedure for which you received sedation. Ensure it is someone we can share information with regarding your discharge. NOTE: At this time ONLY 2 ADULTS may accompany you   One person MUST stay at hospital entire time if outpatient surgery      3. You must contact your surgeon for instructions IF:  You are taking any blood thinners, aspirin, anti-inflammatory or vitamins. There is a change in your physical condition such as a cold, fever, rash, cuts, sores, or any other infection, especially near your surgical site. 4. Do not drink alcohol the day before or day of your procedure. Do not use any recreational marijuana at least 24 hours or street drugs (heroin, cocaine) at minimum 5 days prior to your procedure. 5. A Pre-Surgical History and Physical MUST be completed WITHIN 30 DAYS OR LESS prior to your procedure. by your Physician or an Urgent Care        THE DAY OF YOUR PROCEDURE:  1. Follow instructions for ARRIVAL TIME as DIRECTED BY YOUR SURGEON. 2. Enter the MAIN entrance from [address] and follow the signs to the [employer] (offered free of charge 7 am-5pm). 3. Enter the Main Entrance of the hospital (do not enter from the lower level of the parking garage). Upon entrance, check in with the  at the surgical information desk on your LEFT. Bring your insurance card and photo ID to register      4. DO NOT EAT ANYTHING 8 hours prior to arrival for surgery. You may have up to 8 ounces of water 4 hours prior to your arrival for surgery.    NOTE: ALL Gastric, Bariatric & Bowel surgery patients - you MUST follow your surgeon's instructions regarding eating/ drinking as you will have very specific instructions to follow. If you did not receive these, call your surgeon's office immediately. 5. MEDICATIONS:  Take the following medications with a SMALL sip of water: NONE  Use your usual dose of inhalers the morning of surgery. BRING your rescue inhaler with you to hospital.   Anesthesia does NOT want you to take insulin the morning of surgery. They will control your blood sugar while you are at the hospital. Please contact your ordering physician for instructions regarding your insulin the night before your procedure. If you have an insulin pump, please keep it set on basal rate. Bariatric patient's call your surgeon if on diabetic medications as some may need to be stopped 1 week prior to surgery    6. Do not swallow additional water when brushing teeth. No gum, candy, mints, or ice chips. Refrain from smoking or at least decrease the amount on day of surgery. 7. Morning of surgery:   Take a shower with an antibacterial soap (i.e., Safeguard or Dial) OR your physician may have instructed you to use Hibiclens. Dress in loose, comfortable clothing appropriate for redressing after your procedure. Do not wear jewelry (including body piercings), make-up (especially NO eye make-up), fingernail polish (NO toenail polish if foot/leg surgery), lotion, powders, or metal hairclips. Do not shave or wax for 72 hours prior to procedure near your operative site. Shaving with a razor can irritate your skin and make it easier to develop an infection. On the day of your procedure, any hair that needs to be removed near the surgical site will be 'clipped' by a healthcare worker using a special clipper designed to avoid skin irritation. 8. Dentures, glasses, or contacts will need to be removed before your procedure. Bring cases for your glasses, contacts, dentures, or hearing aids to protect them while you are in surgery.       9. If you use a CPAP, please bring it with you on the day of your procedure. 10. We recommend that valuable personal belongings such as cash, cell phones, e-tablets, or jewelry, be left at home during your stay. The hospital will not be responsible for valuables that are not secured in the hospital safe. However, if your insurance requires a co-pay, you may want to bring a method of payment, i.e., Check or credit card, if you wish to pay your co-pay the day of surgery. 11. If you are to stay overnight, you may bring a bag with personal items. Please have any large items you may need brought in by your family after your arrival to your hospital room. 12. If you have a Living Will or Durable Power of , please bring a copy on the day of your procedure. How we keep you safe and work to prevent surgical site infections:   1. Health care workers should always check your ID bracelet to verify your name and birth date. You will be asked many times to state your name, date of birth, and allergies. 2. Health care workers should always clean their hands with soap or alcohol gel before providing care to you. It is okay to ask anyone if they cleaned their hands before they touch you. 3. You will be actively involved in verifying the type of procedure you are having and ensuring the correct surgical site. This will be confirmed multiple times prior to your procedure. Do NOT bety your surgery site UNLESS instructed to by your surgeon. 4. When you are in the operating room, your surgical site will be cleansed with a special soap, and in most cases, you will be given an antibiotic before the surgery begins. What to expect AFTER your procedure? 1. Immediately following your procedure, your will be taken to the PACU for the first phase of your recovery. Your nurse will help you recover from any potential side effects of anesthesia, such as extreme drowsiness, changes in your vital signs or breathing patterns.  Nausea, headache, muscle aches, or sore throat may also occur after anesthesia. Your nurse will help you manage these potential side effects. 2. For comfort and safety, arrange to have someone at home with you for the first 24 hours after discharge. 3. You and your family will be given written instructions about your diet, activity, dressing care, medications, and return visits. 4. Once at home, should issues with nausea, pain, or bleeding occur, or should you notice any signs of infection, you should call your surgeon. 5. Always clean your hands before and after caring for your wound. Do not let your family touch your surgery site without cleaning their hands. 6. Narcotic pain medications can cause significant constipation. You may want to add a stool softener to your postoperative medication schedule or speak to your surgeon on how best to manage this SIDE EFFECT. SPECIAL INSTRUCTIONS NONE    Thank you for allowing us to care for you. We strive to exceed your expectations in the delivery of care and service provided to you and your family. If you need to contact the [place] [address] staff for any reason, please call us at [phone]    Instructions reviewed with patient during preadmission testing phone interview.   Dominik Ren RN.12/6/2022 .4:21 PM      ADDITIONAL EDUCATIONAL INFORMATION REVIEWED PER PHONE WITH YOU AND/OR YOUR FAMILY:  No Hibiclens® Bathing Instructions   Yes Antibacterial Soap

## 2022-12-06 NOTE — PROGRESS NOTES
SPOKE Ya Loud AT DR MARISCAL' OFFICE, NOTIFIED PT DOESN'T HAVE CARDIAC CLEARANCE, PT SEES DR HALE FOR CARDIO, PT STATES SHE HASN'T SCHEDULED YET-DG

## 2022-12-06 NOTE — TELEPHONE ENCOUNTER
Patient needs cardiac clearance per nitish in pre admission testing patient was told to call cardiologist this is FYI for Dr Cliff Wong thank you

## 2022-12-06 NOTE — PROGRESS NOTES
Place patient label inside box (if no patient label, complete below)  Name:  :  MR#:     Anai Gary [address] / PROCEDURE  I (we), Sidney Reveles. (Patient Name) authorize Merrick Flores MD (Provider / Lacie Barba) and/or such assistants as may be selected by him/her, to perform the following operation/procedure(s): SEPTAL RECONSTRUCTION, BILATERAL INFERIOR TURBINATE RESECTION       Note: If unable to obtain consent prior to an emergent procedure, document the emergent reason in the medical record. This procedure has been explained to my (our) satisfaction and included in the explanation was: The intended benefit, nature, and extent of the procedure to be performed; The significant risks involved and the probability of success; Alternative procedures and methods of treatment; The dangers and probable consequences of such alternatives (including no procedure or treatment); The expected consequences of the procedure on my future health; Whether other qualified individuals would be performing important surgical tasks and/or whether  would be present to advise or support the procedure. I (we) understand that there are other risks of infection and other serious complications in the pre-operative/procedural and postoperative/procedural stages of my (our) care. I (we) have asked all of the questions which I (we) thought were important in deciding whether or not to undergo treatment or diagnosis. These questions have been answered to my (our) satisfaction. I (we) understand that no assurance can be given that the procedure will be a success, and no guarantee or warranty of success has been given to me (us). It has been explained to me (us) that during the course of the operation/procedure, unforeseen conditions may be revealed that necessitate extension of the original procedure(s) or different procedure(s) than those set forth in Paragraph 1.  I (we) authorize and request that the above-named physician, his/her assistants or his/her designees, perform procedures as necessary and desirable if deemed to be in my (our) best interest.     Revised 8/2/2021                                                                          Page 1 of 2       I acknowledge that health care personnel may be observing this procedure for the purpose of medical education or other specified purposes as may be necessary as requested and/or approved by my (our) physician. I (we) consent to the disposal by the hospital Pathologist of the removed tissue, parts or organs in accordance with hospital policy. I do ____ do not ____ consent to the use of a local infiltration pain blocking agent that will be used by my provider/surgical provider to help alleviate pain during my procedure. I do ____ do not ____ consent to an emergent blood transfusion in the case of a life-threatening situation that requires blood components to be administered. This consent is valid for 24 hours from the beginning of the procedure. This patient does ____ or does not ____ currently have a DNR status/order. If DNR order is in place, obtain Addendum to the Surgical Consent for ALL Patients with a DNR Order to address becca-operative status for limited intervention or DNR suspension.      I have read and fully understand the above Consent for Operation/Procedure and that all blanks were completed before I signed the consent.   _____________________________       _____________________      ____/____am/pm  Signature of Patient or legal representative      Printed Name / Relationship            Date / Time   ____________________________       _____________________      ____/____am/pm  Witness to Signature                                    Printed Name                    Date / Time    If patient is unable to sign or is a minor, complete the following)  Patient is a minor, ____ years of age, or unable to sign because:   ______________________________________________________________________________________________    If a phone consent is obtained, consent will be documented by using two health care professionals, each affirming that the consenting party has no questions and gives consent for the procedure discussed with the physician/provider.   _____________________          ____________________       _____/_____am/pm   2nd witness to phone consent        Printed name           Date / Time    Informed Consent:  I have provided the explanation described above in section 1 to the patient and/or legal representative.  I have provided the patient and/or legal representative with an opportunity to ask any questions about the proposed operation/procedure.   ___________________________          ____________________         ____/____am/pm  Provider / Proceduralist                            Printed name            Date / Time  Revised 8/2/2021                                                                      Page 2 of 2

## 2022-12-07 ENCOUNTER — ANESTHESIA EVENT (OUTPATIENT)
Dept: OPERATING ROOM | Age: 82
End: 2022-12-07
Payer: MEDICARE

## 2022-12-07 ENCOUNTER — OFFICE VISIT (OUTPATIENT)
Dept: CARDIOLOGY CLINIC | Age: 82
End: 2022-12-07

## 2022-12-07 VITALS
HEART RATE: 68 BPM | WEIGHT: 214.6 LBS | SYSTOLIC BLOOD PRESSURE: 120 MMHG | DIASTOLIC BLOOD PRESSURE: 68 MMHG | BODY MASS INDEX: 36.84 KG/M2

## 2022-12-07 DIAGNOSIS — Z01.810 PREOP CARDIOVASCULAR EXAM: ICD-10-CM

## 2022-12-07 DIAGNOSIS — R06.02 SOB (SHORTNESS OF BREATH): ICD-10-CM

## 2022-12-07 DIAGNOSIS — R06.02 SHORTNESS OF BREATH: Primary | ICD-10-CM

## 2022-12-07 DIAGNOSIS — E78.5 HYPERLIPIDEMIA, UNSPECIFIED HYPERLIPIDEMIA TYPE: ICD-10-CM

## 2022-12-07 ASSESSMENT — ENCOUNTER SYMPTOMS
VOMITING: 0
ABDOMINAL DISTENTION: 0
WHEEZING: 0
EYE DISCHARGE: 0
ABDOMINAL PAIN: 0
FACIAL SWELLING: 0
SHORTNESS OF BREATH: 1
CHEST TIGHTNESS: 0
COLOR CHANGE: 0
BACK PAIN: 0
COUGH: 0
BLOOD IN STOOL: 0

## 2022-12-07 NOTE — PROGRESS NOTES
[facility]     Outpatient Cardiology         Patient Name:  Lloyd Chua  Requesting Physician: No admitting provider for patient encounter. Primary Care Physician: Franki Simmons DO    Reason for Consultation/Chief Complaint:   Chief Complaint   Patient presents with    Pre-op Exam     Sinus surgery         History of Present Illness:    HPI     Porter Parikh a 81 y.o. female with PMH of aortic valve insufficiency, syncope, HLD, dizziness. Here to establish care and CV risk assessment for septal reconstruction. Former Dr. Alonso Like patient. AI, mild to moderate in the past, having some shortness of breath overall. Today we discussed getting a repeat echocardiogram.  HLD, on Lipitor. No side effects  On and off she does have lightheadedness/presyncopal symptoms. No particular trigger relieving factors. No particular premonitory symptoms either. From a preop cardiovascular perspective she is okay to proceed with her upcoming ENT surgery.       PMH  Past Medical History:   Diagnosis Date    Arthritis     Chronic back pain     Dizziness     History of blood transfusion     1972 [address]    Hyperlipidemia     Nosebleed     Overactive bladder     Rash     Syncope and collapse     3 XS IN LAST 3 MONTHS,UNKNOWN ORIGIN       PSH  Past Surgical History:   Procedure Laterality Date    APPENDECTOMY      BACK SURGERY      KNEE SURGERY      bilat    NECK SURGERY          Social HIstory  Social History     Tobacco Use    Smoking status: Never    Smokeless tobacco: Never   Vaping Use    Vaping Use: Never used   Substance Use Topics    Alcohol use: Not Currently    Drug use: Never       Family History  Family History   Problem Relation Age of Onset    Diabetes Mother     Heart Disease Father     Diabetes Father     Depression Maternal Grandmother     Heart Disease Paternal Grandfather        Allergies   No Known Allergies    Medications:     Home Medications:  Were reviewed and are listed in nursing record. and/or listed below    Prior to Admission medications    Medication Sig Start Date End Date Taking? Authorizing Provider   OnabotulinumtoxinA, Cosmetic, 100 units SOLR Inject 100 Units into the skin once   Yes Historical Provider, MD   trimethoprim (TRIMPEX) 100 MG tablet TAKE 1 TABLET BY MOUTH EVERY DAY 9/13/22  Yes Historical Provider, MD   Cholecalciferol (VITAMIN D3) 50 MCG (2000 UT) CAPS Take by mouth   Yes Historical Provider, MD   loperamide (IMODIUM) 1 MG/5ML solution Take by mouth  Patient not taking: Reported on 12/7/2022    Historical Provider, MD   cyclobenzaprine (FLEXERIL) 10 MG tablet as needed  Patient not taking: Reported on 12/7/2022 9/14/22   Historical Provider, MD   MYRBETRIQ 25 [address]  Patient not taking: Reported on 12/7/2022 10/12/22   Historical Provider, MD   Multiple Vitamins-Minerals (CENTRUM SILVER ULTRA WOMENS) TABS Take by mouth  Patient not taking: Reported on 12/7/2022    Historical Provider, MD   aspirin 81 MG chewable tablet Take 81 mg by mouth daily  Patient not taking: Reported on 12/7/2022    Historical Provider, MD   Omega-3 Fatty Acids (FISH OIL) 1000 MG CAPS Take 2,000 mg by mouth daily  Patient not taking: Reported on 12/7/2022    Historical Provider, MD   atorvastatin (LIPITOR) 20 MG tablet Take 20 mg by mouth daily. Patient not taking: Reported on 12/7/2022    Historical Provider, MD        Review of Systems   Constitutional:  Negative for activity change, appetite change, diaphoresis, fatigue, fever and unexpected weight change. HENT:  Negative for congestion, facial swelling, mouth sores and nosebleeds. Eyes:  Negative for discharge and visual disturbance. Respiratory:  Positive for shortness of breath. Negative for cough, chest tightness and wheezing. Cardiovascular:  Negative for chest pain, palpitations and leg swelling.    Gastrointestinal:  Negative for abdominal distention, abdominal pain, blood in stool and vomiting. Endocrine: Negative for cold intolerance, heat intolerance and polyuria. Genitourinary:  Negative for difficulty urinating, dysuria, frequency and hematuria. Musculoskeletal:  Negative for back pain, joint swelling, myalgias and neck pain. Skin:  Negative for color change, pallor and rash. Allergic/Immunologic: Negative for immunocompromised state. Neurological:  Positive for light-headedness. Negative for dizziness, syncope, weakness, numbness and headaches. Hematological:  Negative for adenopathy. Does not bruise/bleed easily. Psychiatric/Behavioral:  Negative for behavioral problems, confusion, decreased concentration and suicidal ideas. The patient is not nervous/anxious. Vitals:    12/07/22 1143   BP: 120/68   Pulse: 68    Weight: 214 lb 9.6 oz (97.3 kg)       Vitals:    12/07/22 1143   BP: 120/68   Pulse: 68   Weight: 214 lb 9.6 oz (97.3 kg)       BP Readings from Last 3 Encounters:   12/07/22 120/68   11/16/22 112/63   03/08/21 (!) 146/79       Wt Readings from Last 3 Encounters:   12/07/22 214 lb 9.6 oz (97.3 kg)   12/06/22 208 lb (94.3 kg)   11/16/22 218 lb (98.9 kg)       Physical Exam  Constitutional:       General: She is not in acute distress. Appearance: She is well-developed. She is not diaphoretic. HENT:      Head: Normocephalic and atraumatic. Eyes:      Pupils: Pupils are equal, round, and reactive to light. Neck:      Thyroid: No thyromegaly. Vascular: No JVD. Cardiovascular:      Rate and Rhythm: Normal rate and regular rhythm. Chest Wall: PMI is not displaced. Heart sounds: S1 normal and S2 normal. Murmur heard. No friction rub. No gallop. Pulmonary:      Effort: Pulmonary effort is normal. No respiratory distress. Breath sounds: Normal breath sounds. No stridor. No wheezing or rales. Chest:      Chest wall: No tenderness. Abdominal:      General: Bowel sounds are normal. There is no distension.       Palpations: Abdomen is soft. Tenderness: There is no abdominal tenderness. There is no guarding or rebound. Musculoskeletal:         General: No tenderness. Normal range of motion. Cervical back: Normal range of motion. Lymphadenopathy:      Cervical: No cervical adenopathy. Skin:     General: Skin is warm and dry. Findings: No erythema or rash. Neurological:      Mental Status: She is alert and oriented to person, place, and time. Coordination: Coordination normal.   Psychiatric:         Behavior: Behavior normal.         Thought Content: Thought content normal.         Judgment: Judgment normal.       Labs:       Lab Results   Component Value Date    WBC 5.9 07/30/2020    HGB 13.9 07/30/2020    HCT 41.5 07/30/2020    MCV 96.4 07/30/2020     07/30/2020     Lab Results   Component Value Date     07/30/2020    K 5.2 (H) 07/30/2020     07/30/2020    CO2 23 07/30/2020    BUN 20 07/30/2020    CREATININE 0.8 07/30/2020    GLUCOSE 108 (H) 07/30/2020    CALCIUM 9.3 07/30/2020    PROT 6.7 07/30/2020    LABALBU 4.1 07/30/2020    BILITOT 1.1 (H) 07/30/2020    ALKPHOS 53 07/30/2020    AST 42 (H) 07/30/2020    ALT 19 07/30/2020    LABGLOM >60 07/30/2020    GFRAA >60 07/30/2020    AGRATIO 1.6 07/30/2020    GLOB 2.6 07/30/2020         Lab Results   Component Value Date    CHOL 177 07/24/2019    CHOL 174 06/02/2010     Lab Results   Component Value Date    TRIG 153 (H) 07/24/2019    TRIG 116 06/02/2010     Lab Results   Component Value Date    HDL 62 (H) 07/24/2019    HDL 50 06/02/2010     Lab Results   Component Value Date    LDLCALC 84 07/24/2019    LDLCALC 100 (H) 06/02/2010     Lab Results   Component Value Date    LABVLDL 31 07/24/2019    LABVLDL 23 06/02/2010     No results found for: [facility]    Lab Results   Component Value Date    INR 0.96 10/20/2019    PROTIME 11.0 10/20/2019       The ASCVD Risk score (Luis Miguel POSADA, et al., 2019) failed to calculate for the following reasons:     The 2019 ASCVD risk score is only valid for ages 36 to 78      Imaging:       Last ECG (if available, Personally interpreted):  Normal sinus rhythm, no ischemic changes    Last Monitor/Holter (if available):    Last Stress (if available): 7/31/19  Conclusions        Summary    Overall findings represent a low risk scan. There is normal isotope uptake at stress and rest. There is no evidence of    myocardial ischemia or scar. Normal LV size and systolic function. Non-diagnostic EKG response due to failure to reach target heart rate. Last Cath (if available):    Last TTE/ANEL(if available): 7/10/19   Summary   Normal left ventricular size and wall thickness. Normal left ventricular systolic function with an estimate ejection fraction   of 55-60%. There are no regional wall motion abnormalities. Normal diastolic function. Mild to Moderate aortic regurgitation. Trace mitral, pulmonic, and tricuspid regurgitation. Estimated pulmonary artery systolic pressure is 25 at mmHg assuming a right   atrial pressure of 3 mmHg. No prior echo for comparison. Last CMR  (if available):    Last Coronary Artery Calcium Score: Ankle-brachial index:    Carotid ultrasound screening:    Abdominal aortic aneurysm screening:       Assessment / Plan:     SOB (shortness of breath)  Plan for echocardiogram to further assess aortic vegetation on LV function. Also plan for stress test.    Hyperlipidemia  Continue Lipitor if possible. Preop cardiovascular exam  Okay to proceed with upcoming ENT surgery. No need for further work-up prior to surgery. Follow up in 3 months. I had the opportunity to review the clinical symptoms and presentation of Kay Fajardo. Patient's allergies and medications were reviewed and updated. Patient's past medical, surgical, social and family history were reviewed and updated. Patient's testing including laboratory, ECGs, monitor, imaging (TTE,ANEL,CMR,cath) were reviewed. Tobacco use was discussed with the patient and educated on the negative effects. I have asked the patient to not utilize these agents. All questions and concerns were addressed to the patient/family. Alternatives to my treatment were discussed. The note was completed using EMR. Every effort wasmade to ensure accuracy; however, inadvertent computerized transcription errors may be present. Thank you for allowing me to participate in thecare or [address].  Dorinda Stevenson MD, [facility]

## 2022-12-07 NOTE — ASSESSMENT & PLAN NOTE
Plan for echocardiogram to further assess aortic vegetation on LV function.   Also plan for stress test.

## 2022-12-08 ENCOUNTER — HOSPITAL ENCOUNTER (OUTPATIENT)
Age: 82
Setting detail: OUTPATIENT SURGERY
Discharge: HOME | End: 2022-12-08
Attending: OTOLARYNGOLOGY | Admitting: OTOLARYNGOLOGY
Payer: MEDICARE

## 2022-12-08 ENCOUNTER — ANESTHESIA (OUTPATIENT)
Dept: OPERATING ROOM | Age: 82
End: 2022-12-08
Payer: MEDICARE

## 2022-12-08 VITALS
RESPIRATION RATE: 18 BRPM | OXYGEN SATURATION: 97 % | TEMPERATURE: 96.9 F | SYSTOLIC BLOOD PRESSURE: 123 MMHG | HEIGHT: 64 IN | BODY MASS INDEX: 36.29 KG/M2 | HEART RATE: 66 BPM | DIASTOLIC BLOOD PRESSURE: 64 MMHG | WEIGHT: 212.6 LBS

## 2022-12-08 DIAGNOSIS — J34.2 DEVIATED NASAL SEPTUM: ICD-10-CM

## 2022-12-08 DIAGNOSIS — J34.89 NASAL OBSTRUCTION: Primary | ICD-10-CM

## 2022-12-08 DIAGNOSIS — J34.3 HYPERTROPHY OF NASAL TURBINATES: ICD-10-CM

## 2022-12-08 PROCEDURE — 30802 ABLATE INF TURBINATE SUBMUC: CPT | Performed by: OTOLARYNGOLOGY

## 2022-12-08 PROCEDURE — 7100000010 HC PHASE II RECOVERY - FIRST 15 MIN: Performed by: OTOLARYNGOLOGY

## 2022-12-08 PROCEDURE — 3600000004 HC SURGERY LEVEL 4 BASE: Performed by: OTOLARYNGOLOGY

## 2022-12-08 PROCEDURE — 6370000000 HC RX 637 (ALT 250 FOR IP): Performed by: ANESTHESIOLOGY

## 2022-12-08 PROCEDURE — 2709999900 HC NON-CHARGEABLE SUPPLY: Performed by: OTOLARYNGOLOGY

## 2022-12-08 PROCEDURE — 7100000000 HC PACU RECOVERY - FIRST 15 MIN: Performed by: OTOLARYNGOLOGY

## 2022-12-08 PROCEDURE — 3700000000 HC ANESTHESIA ATTENDED CARE: Performed by: OTOLARYNGOLOGY

## 2022-12-08 PROCEDURE — 2500000003 HC RX 250 WO HCPCS: Performed by: OTOLARYNGOLOGY

## 2022-12-08 PROCEDURE — 7100000011 HC PHASE II RECOVERY - ADDTL 15 MIN: Performed by: OTOLARYNGOLOGY

## 2022-12-08 PROCEDURE — 7100000001 HC PACU RECOVERY - ADDTL 15 MIN: Performed by: OTOLARYNGOLOGY

## 2022-12-08 PROCEDURE — 30520 REPAIR OF NASAL SEPTUM: CPT | Performed by: OTOLARYNGOLOGY

## 2022-12-08 PROCEDURE — 2580000003 HC RX 258: Performed by: ANESTHESIOLOGY

## 2022-12-08 PROCEDURE — 3700000001 HC ADD 15 MINUTES (ANESTHESIA): Performed by: OTOLARYNGOLOGY

## 2022-12-08 PROCEDURE — 6360000002 HC RX W HCPCS: Performed by: NURSE ANESTHETIST, CERTIFIED REGISTERED

## 2022-12-08 PROCEDURE — 3600000014 HC SURGERY LEVEL 4 ADDTL 15MIN: Performed by: OTOLARYNGOLOGY

## 2022-12-08 PROCEDURE — 2500000003 HC RX 250 WO HCPCS: Performed by: NURSE ANESTHETIST, CERTIFIED REGISTERED

## 2022-12-08 PROCEDURE — 6360000002 HC RX W HCPCS: Performed by: ANESTHESIOLOGY

## 2022-12-08 RX ORDER — HYDRALAZINE HYDROCHLORIDE 20 MG/ML
10 INJECTION INTRAMUSCULAR; INTRAVENOUS
Status: DISCONTINUED | OUTPATIENT
Start: 2022-12-08 | End: 2022-12-08 | Stop reason: HOSPADM

## 2022-12-08 RX ORDER — OXYCODONE HYDROCHLORIDE 5 MG/1
10 TABLET ORAL PRN
Status: DISCONTINUED | OUTPATIENT
Start: 2022-12-08 | End: 2022-12-08 | Stop reason: HOSPADM

## 2022-12-08 RX ORDER — SODIUM CHLORIDE 9 MG/ML
INJECTION, SOLUTION INTRAVENOUS PRN
Status: DISCONTINUED | OUTPATIENT
Start: 2022-12-08 | End: 2022-12-08 | Stop reason: HOSPADM

## 2022-12-08 RX ORDER — DIPHENHYDRAMINE HYDROCHLORIDE 50 MG/ML
12.5 INJECTION INTRAMUSCULAR; INTRAVENOUS
Status: DISCONTINUED | OUTPATIENT
Start: 2022-12-08 | End: 2022-12-08 | Stop reason: HOSPADM

## 2022-12-08 RX ORDER — PROPOFOL 10 MG/ML
INJECTION, EMULSION INTRAVENOUS PRN
Status: DISCONTINUED | OUTPATIENT
Start: 2022-12-08 | End: 2022-12-08 | Stop reason: SDUPTHER

## 2022-12-08 RX ORDER — SODIUM CHLORIDE 0.9 % (FLUSH) 0.9 %
5-40 SYRINGE (ML) INJECTION PRN
Status: DISCONTINUED | OUTPATIENT
Start: 2022-12-08 | End: 2022-12-08 | Stop reason: HOSPADM

## 2022-12-08 RX ORDER — MEPERIDINE HYDROCHLORIDE 25 MG/ML
12.5 INJECTION INTRAMUSCULAR; INTRAVENOUS; SUBCUTANEOUS EVERY 5 MIN PRN
Status: DISCONTINUED | OUTPATIENT
Start: 2022-12-08 | End: 2022-12-08 | Stop reason: HOSPADM

## 2022-12-08 RX ORDER — GLYCOPYRROLATE 1 MG/5 ML
SYRINGE (ML) INTRAVENOUS PRN
Status: DISCONTINUED | OUTPATIENT
Start: 2022-12-08 | End: 2022-12-08 | Stop reason: SDUPTHER

## 2022-12-08 RX ORDER — METOCLOPRAMIDE HYDROCHLORIDE 5 MG/ML
10 INJECTION INTRAMUSCULAR; INTRAVENOUS
Status: DISCONTINUED | OUTPATIENT
Start: 2022-12-08 | End: 2022-12-08 | Stop reason: HOSPADM

## 2022-12-08 RX ORDER — LABETALOL HYDROCHLORIDE 5 MG/ML
10 INJECTION, SOLUTION INTRAVENOUS
Status: DISCONTINUED | OUTPATIENT
Start: 2022-12-08 | End: 2022-12-08 | Stop reason: HOSPADM

## 2022-12-08 RX ORDER — ATORVASTATIN CALCIUM 20 MG/1
20 TABLET, FILM COATED ORAL DAILY
COMMUNITY

## 2022-12-08 RX ORDER — ONDANSETRON 2 MG/ML
INJECTION INTRAMUSCULAR; INTRAVENOUS PRN
Status: DISCONTINUED | OUTPATIENT
Start: 2022-12-08 | End: 2022-12-08 | Stop reason: SDUPTHER

## 2022-12-08 RX ORDER — GLYCOPYRROLATE 0.2 MG/ML
INJECTION INTRAMUSCULAR; INTRAVENOUS PRN
Status: DISCONTINUED | OUTPATIENT
Start: 2022-12-08 | End: 2022-12-08 | Stop reason: SDUPTHER

## 2022-12-08 RX ORDER — FENTANYL CITRATE 50 UG/ML
INJECTION, SOLUTION INTRAMUSCULAR; INTRAVENOUS PRN
Status: DISCONTINUED | OUTPATIENT
Start: 2022-12-08 | End: 2022-12-08

## 2022-12-08 RX ORDER — FENTANYL CITRATE 50 UG/ML
INJECTION, SOLUTION INTRAMUSCULAR; INTRAVENOUS PRN
Status: DISCONTINUED | OUTPATIENT
Start: 2022-12-08 | End: 2022-12-08 | Stop reason: SDUPTHER

## 2022-12-08 RX ORDER — LIDOCAINE HYDROCHLORIDE 10 MG/ML
1 INJECTION, SOLUTION EPIDURAL; INFILTRATION; INTRACAUDAL; PERINEURAL
Status: DISCONTINUED | OUTPATIENT
Start: 2022-12-08 | End: 2022-12-08 | Stop reason: HOSPADM

## 2022-12-08 RX ORDER — SODIUM CHLORIDE 0.9 % (FLUSH) 0.9 %
5-40 SYRINGE (ML) INJECTION EVERY 12 HOURS SCHEDULED
Status: DISCONTINUED | OUTPATIENT
Start: 2022-12-08 | End: 2022-12-08 | Stop reason: HOSPADM

## 2022-12-08 RX ORDER — LIDOCAINE HYDROCHLORIDE AND EPINEPHRINE 10; 10 MG/ML; UG/ML
INJECTION, SOLUTION INFILTRATION; PERINEURAL PRN
Status: DISCONTINUED | OUTPATIENT
Start: 2022-12-08 | End: 2022-12-08 | Stop reason: HOSPADM

## 2022-12-08 RX ORDER — SODIUM CHLORIDE, SODIUM LACTATE, POTASSIUM CHLORIDE, CALCIUM CHLORIDE 600; 310; 30; 20 MG/100ML; MG/100ML; MG/100ML; MG/100ML
INJECTION, SOLUTION INTRAVENOUS CONTINUOUS
Status: DISCONTINUED | OUTPATIENT
Start: 2022-12-08 | End: 2022-12-08 | Stop reason: HOSPADM

## 2022-12-08 RX ORDER — ROCURONIUM BROMIDE 10 MG/ML
INJECTION, SOLUTION INTRAVENOUS PRN
Status: DISCONTINUED | OUTPATIENT
Start: 2022-12-08 | End: 2022-12-08 | Stop reason: SDUPTHER

## 2022-12-08 RX ORDER — MIDAZOLAM HYDROCHLORIDE 1 MG/ML
INJECTION INTRAMUSCULAR; INTRAVENOUS PRN
Status: DISCONTINUED | OUTPATIENT
Start: 2022-12-08 | End: 2022-12-08 | Stop reason: SDUPTHER

## 2022-12-08 RX ORDER — SODIUM CHLORIDE 9 MG/ML
25 INJECTION, SOLUTION INTRAVENOUS PRN
Status: DISCONTINUED | OUTPATIENT
Start: 2022-12-08 | End: 2022-12-08 | Stop reason: HOSPADM

## 2022-12-08 RX ORDER — OXYCODONE HYDROCHLORIDE AND ACETAMINOPHEN 5; 325 MG/1; MG/1
1 TABLET ORAL EVERY 4 HOURS PRN
Qty: 25 TABLET | Refills: 0 | Status: SHIPPED | OUTPATIENT
Start: 2022-12-08 | End: 2022-12-13

## 2022-12-08 RX ORDER — OXYCODONE HYDROCHLORIDE 5 MG/1
5 TABLET ORAL PRN
Status: DISCONTINUED | OUTPATIENT
Start: 2022-12-08 | End: 2022-12-08 | Stop reason: HOSPADM

## 2022-12-08 RX ORDER — LIDOCAINE HYDROCHLORIDE 20 MG/ML
INJECTION, SOLUTION INTRAVENOUS PRN
Status: DISCONTINUED | OUTPATIENT
Start: 2022-12-08 | End: 2022-12-08 | Stop reason: SDUPTHER

## 2022-12-08 RX ADMIN — ONDANSETRON 4 MG: 2 INJECTION INTRAMUSCULAR; INTRAVENOUS at 09:11

## 2022-12-08 RX ADMIN — ROCURONIUM BROMIDE 40 MG: 10 INJECTION INTRAVENOUS at 08:38

## 2022-12-08 RX ADMIN — HYDROMORPHONE HYDROCHLORIDE 0.5 MG: 1 INJECTION, SOLUTION INTRAMUSCULAR; INTRAVENOUS; SUBCUTANEOUS at 10:14

## 2022-12-08 RX ADMIN — GLYCOPYRROLATE 0.2 MG: 0.2 INJECTION INTRAMUSCULAR; INTRAVENOUS at 08:38

## 2022-12-08 RX ADMIN — GLYCOPYRROLATE 0.1 MG: 0.2 INJECTION INTRAMUSCULAR; INTRAVENOUS at 09:03

## 2022-12-08 RX ADMIN — Medication 0.2 MG: at 08:57

## 2022-12-08 RX ADMIN — PROPOFOL 150 MG: 10 INJECTION, EMULSION INTRAVENOUS at 08:38

## 2022-12-08 RX ADMIN — SODIUM CHLORIDE, POTASSIUM CHLORIDE, SODIUM LACTATE AND CALCIUM CHLORIDE: 600; 310; 30; 20 INJECTION, SOLUTION INTRAVENOUS at 07:32

## 2022-12-08 RX ADMIN — MIDAZOLAM HYDROCHLORIDE 2 MG: 2 INJECTION, SOLUTION INTRAMUSCULAR; INTRAVENOUS at 08:38

## 2022-12-08 RX ADMIN — FENTANYL CITRATE 100 MCG: 50 INJECTION, SOLUTION INTRAMUSCULAR; INTRAVENOUS at 08:36

## 2022-12-08 RX ADMIN — LIDOCAINE HYDROCHLORIDE 100 MG: 20 INJECTION, SOLUTION INTRAVENOUS at 08:38

## 2022-12-08 RX ADMIN — BENZOCAINE 6 MG-MENTHOL 10 MG LOZENGES 1 LOZENGE: at 13:01

## 2022-12-08 RX ADMIN — SUGAMMADEX 200 MG: 100 INJECTION, SOLUTION INTRAVENOUS at 09:11

## 2022-12-08 RX ADMIN — PROPOFOL 50 MG: 10 INJECTION, EMULSION INTRAVENOUS at 08:47

## 2022-12-08 ASSESSMENT — PAIN DESCRIPTION - LOCATION
LOCATION: THROAT

## 2022-12-08 ASSESSMENT — PAIN DESCRIPTION - ONSET
ONSET: ON-GOING

## 2022-12-08 ASSESSMENT — PAIN DESCRIPTION - DESCRIPTORS
DESCRIPTORS: SORE

## 2022-12-08 ASSESSMENT — PAIN - FUNCTIONAL ASSESSMENT
PAIN_FUNCTIONAL_ASSESSMENT: ACTIVITIES ARE NOT PREVENTED
PAIN_FUNCTIONAL_ASSESSMENT: ACTIVITIES ARE NOT PREVENTED
PAIN_FUNCTIONAL_ASSESSMENT: 0-10
PAIN_FUNCTIONAL_ASSESSMENT: ACTIVITIES ARE NOT PREVENTED

## 2022-12-08 ASSESSMENT — PAIN DESCRIPTION - FREQUENCY
FREQUENCY: CONTINUOUS

## 2022-12-08 ASSESSMENT — PAIN DESCRIPTION - PAIN TYPE
TYPE: ACUTE PAIN

## 2022-12-08 ASSESSMENT — PAIN DESCRIPTION - ORIENTATION
ORIENTATION: INNER

## 2022-12-08 ASSESSMENT — ENCOUNTER SYMPTOMS: SHORTNESS OF BREATH: 1

## 2022-12-08 ASSESSMENT — PAIN SCALES - GENERAL
PAINLEVEL_OUTOF10: 7
PAINLEVEL_OUTOF10: 4
PAINLEVEL_OUTOF10: 0
PAINLEVEL_OUTOF10: 3
PAINLEVEL_OUTOF10: 4
PAINLEVEL_OUTOF10: 4

## 2022-12-08 NOTE — PROGRESS NOTES
PACU Transfer to [facility]    Vitals:    12/08/22 1115   BP: (!) 139/126   Pulse: 77   Resp:    Temp:    SpO2: 91%       No intake or output data in the 24 hours ending 12/08/22 1134    Pain assessment:  present - adequately treated  Pain Level: 3    Patient transferred to care of [facility] RN.    12/8/2022 11:34 AM

## 2022-12-08 NOTE — PROGRESS NOTES
Pt is resting in bed. Call light in reach.  to take cane but we are to lock up 2 clothing bags in locked room. Consents are signed. LR running through IV.

## 2022-12-08 NOTE — ANESTHESIA POSTPROCEDURE EVALUATION
Department of Anesthesiology  Postprocedure Note    Patient: Ronit Smith  MRN: [id number]  YOB: 1940  Date of evaluation: 12/8/2022      Procedure Summary     Date: 12/08/22 Room / Location: [address] [place] [address]    Anesthesia Start: 8666 Anesthesia Stop: 2082    Procedure: SEPTAL RECONSTRUCTION, BILATERAL INFERIOR TURBINATE RESECTION Diagnosis:       Nasal obstruction      Deviated septum      Nasal turbinate hypertrophy      (Nasal obstruction [J34.89])      (Deviated septum [J34.2])      (Nasal turbinate hypertrophy [J34.3])    Surgeons: Issac Chen MD Responsible Provider:     Anesthesia Type: general ASA Status: 2          Anesthesia Type: No value filed.     Macho Phase I: Macho Score: 10    Macho Phase II: Macho Score: 7      Anesthesia Post Evaluation    Patient location during evaluation: PACU  Patient participation: complete - patient participated  Level of consciousness: awake and alert  Pain score: 0  Airway patency: patent  Nausea & Vomiting: no nausea and no vomiting  Complications: no  Cardiovascular status: hemodynamically stable  Respiratory status: acceptable  Hydration status: euvolemic

## 2022-12-08 NOTE — BRIEF OP NOTE
Brief Postoperative Note      Patient: Kay Fajardo  YOB: 1940  MRN: [id number]    Date of Procedure: 12/8/2022    Pre-Op Diagnosis: Nasal obstruction [J34.89]  Deviated septum [J34.2]  Nasal turbinate hypertrophy [J34.3]    Post-Op Diagnosis: Same       Procedure:  Sepatl reconstruction. Cautery to inferior turbinates    Surgeon(s):   Rhonda Yo MD    Assistant:  * No surgical staff found *    Anesthesia: General    Estimated Blood Loss (mL): Minimal    Complications: None    Specimens:   * No specimens in log *    Implants:  * No implants in log *      Drains: * No LDAs found *      Electronically signed by Rhonda Yo MD on 12/8/2022 at 9:32 AM

## 2022-12-08 NOTE — BRIEF OP NOTE
Brief Postoperative Note      Patient: Parker Mejía  YOB: 1940  MRN: [id number]    Date of Procedure: 12/8/2022    Pre-Op Diagnosis: Nasal obstruction [J34.89]  Deviated septum [J34.2]  Nasal turbinate hypertrophy [J34.3]    Post-Op Diagnosis: Same       PROCEDURE:  Septal Reconstruction. Bilateral cautery to inferior turbiinates    Surgeon(s):   Arely Padilla MD    Assistant:  * No surgical staff found *    Anesthesia: General    Estimated Blood Loss (mL): Minimal    Complications: None    Specimens:   * No specimens in log *    Implants:  * No implants in log *      Drains: * No LDAs found *        Electronically signed by Arely Padilla MD on 12/8/2022 at 9:44 AM

## 2022-12-08 NOTE — H&P
Full history and physical exam performed within the last 24 hours. No changes in the interval since H&P completed.     ALVINA Borrego CNP 12/8/2022

## 2022-12-08 NOTE — DISCHARGE INSTRUCTIONS
while the adhesive is in place. These may loosen the film before your wound is healed. You may occasionally and briefly wet your wound in the shower. After showering, gently blot you wound dry with a soft towel. []Drain Care  []Ice to operative site for 15-30 minutes of each hour while awake for 24-36 hours  []Use cooling system as instructed  []Post-op shoe-wear when up and about  []Other-Wear bra continuously for 24-36 hours  []Other:    MEDICATION INSTRUCTIONS:  Prescription(S) x   1  sent with you. Use as directed. When taking pain medications, you may experience the side effect of dizziness or drowsiness. Do not drink alcohol or drive when taking these medications. [x]Give the list of your medications to your primary care physician on your next visit. Keep your med list updated and carry it with in case of emergencies. Narcotic pain medications can cause the side effect of significant constipation. You may want to add a stool softener to your postoperative medication schedule or speak to your surgeon on how best to manage this side effect. NARCOTIC SAFETY:  Your pain medicine is only for you to take. Safely store your medicines. Store pills up high and out of reach of children and pets. Ensure safety caps are snapped tightly  Keep track of how many pills you have left    Unused medication can be disposed of by taking them to a drop-off box or take-back program that is authorized by the [facility]. Access to a site near you can be found on the [facility] [employer] website ([address]. [facility].gov). If you have a CPAP machine, it is very important that you use it daily during all periods of sleep and daytime rest during your recovery at home. Surgery and Anesthesia place a significant amount of stress on your body. Using your CPAP will help keep you safe and lessen the negative effects of that stress.     FOLLOW-UP RECOVERY CARE:  [x]Call the office at  for follow-up appointment and problems    Watch for these possible complications or symptoms:   Signs of INFECTION   > Fever over 100.4°     > Redness, swelling, hardness or warmth at the operative site   >Foul smelling or cloudy drainage at the operative site   Blood soaked dressing. (Some oozing may be normal)  Numb, pale, blue, cold or tingling extremity    Notify your physician if they occur or you have prolonged anesthesia/sedation side effects. Date/time 12/8/2022 10:56 AM         PACU:  [phone]   M-F 700 AM - 7 PM      SAME DAY SERVICES:  [phone] M-F 7AM-6PM          PACU:  [phone]      SAME DAY SERVICES:  [phone]      (M-F 8am - 8pm)                    (M-F 6am - 6pm)        If you smoke STOP. We care about your health! FAQs  (frequently asked questions)  About Surgical Site Infections    What is a Surgical Site Infection (SSI)? A surgical site infection is an infection that occurs after surgery in the part of the body where the surgery took place. Most patients who have surgery do not develop an infection. However, infections develop in about 1 to 3 out of every 100 patients who have surgery. Some common symptoms of a surgical site infection are:   Redness and pain around the area where you had surgery  Drainage of cloudy fluid from your surgical wound   Fever    Can SSIs be treated? Yes. Most surgical site infections can be treated with antibiotics. The antibiotic given to you depends on the bacteria (germs) causing the infection. Sometimes patients with SSIs also need another surgery to treat the infection. What are some of the things that hospitals are doing to prevent SSIs? To prevent SSIs, doctors, nurses and other healthcare providers:   Clean their hands and arms up to their elbows with an antiseptic agent just before the surgery. Clean their hands with soap and water or an alcohol-based hand rub before and after caring for each patient.    May remove some of your hair immediately before your surgery using electric clippers if the hair is in the same area where the procedure will occur. They should not shave you with a razor. Wear special hair covers, masks, gowns, and gloves during surgery to keep the surgery area clean. Give you antibiotics before your surgery starts. In most cases, you should get antibiotics within 60 minutes before the surgery starts and the antibiotics should be stopped within 24 hours after surgery. Clean the skin at the site of your surgery with a special soap that kills germs. What can I do to help prevent SSIs? Before you surgery:  Tell your doctor about other medical problems you may have. Health problems such as allergies, diabetes, and obesity could affect your surgery and your treatment. Quit smoking. Patients who smoke get more infections. Talk to your doctor about how you can quit before your surgery. Do not shave near where you will have surgery. Shaving with a razor can irritate your skin and make it easier to develop an infection. At the time of your surgery:  Speak up if someone tries to shave you with a razor before surgery. Ask why you need to be shaved and talk with your surgeon if you have any concerns. Ask if you will get antibiotics before surgery. After your surgery:  Make sure that your healthcare providers clean their hands before examining you, either with soap and water or an alcohol-based hand rub. IF YOU DO NOT SEE YOUR PROVIDERS CLEAN THEIR HANDS, PLEASE ASK THEM TO DO SO. Family and friends who visit you should not touch the surgical wound or dressings. Family and friends should clean their hands with soap and water or an alcohol-based hand rub before and after visiting you. If you do not see them clean their hands, ask them to clean their hands.      What do I need to do when I go home from the hospital?  Before you go home, your doctor nurses should explain everything you need to know about taking care of your wound. Make sure you understand how to care for your wound before you leave the hospital.    Always clean your hands before and after caring for your wound. Before you go home, make sure you know who to contact if you have questions or problems after you get home. If you have any symptoms of an infection, such as redness and pain at the surgery site, drainage, or fever, call your doctor immediately. If you have additional questions, please ask your doctor or nurse. Thank you for allowing us to care for you. We hope we have exceeded your expectations and provided a very good overall experience while taking care of you and your family. If you have additional questions, please ask your doctor or nurse. Thank you for allowing us to care for you. We hope we have exceeded your expectations and provided a very good overall experience while taking care of you and your family.

## 2022-12-08 NOTE — OP NOTE
[facility]               [address]                                OPERATIVE REPORT    PATIENT NAME: Kuldip Becerra                    :        1940  MED REC NO:   [id number]                          ROOM:  ACCOUNT NO:   [de-identified]                           ADMIT DATE: 2022  PROVIDER:     Zena Archuleta MD    DATE OF PROCEDURE:  2022    PREOPERATIVE DIAGNOSES:  Nasal obstruction, septal deviation,  hypertrophy of inferior turbinates bilateral.    POSTOPERATIVE DIAGNOSES:  Nasal obstruction, septal deviation,  hypertrophy of inferior turbinates bilateral.    OPERATION:  Septal reconstruction and cautery to inferior turbinates  bilateral.    SURGEON:  Zena Archuleta MD    ANESTHESIA:  General with endotracheal intubation. DESCRIPTION OF THE PROCEDURE:  Under satisfactory general anesthesia,  the septal space was injected with lidocaine 1% with epinephrine  1:100,000 as were the inferior turbinates, 6 mL were used. The  vibrissae were cut, and the nose was decongested with pledgets of Afrin. The patient had a septal deviation to the left with a caudal end of the  septum extending into the left naris. An incision was made with a  15-blade along the leading edge of the septum. This incision was  carried down to the septal cartilage. The plane between the cartilage  and the perichondrium was developed with a Javed knife, and then a  Hooker elevator was used to elevate the left mucoperichondrial flap. The septal cartilage itself was transfixed with a Javed knife 5 mm  posterior from its free edge and a right mucoperichondrial flap was  similarly elevated. Deviated portions of cartilage were removed with  the scissors and with Itzel forceps. Care was taken to leave  adequate cartilage dorsally and caudally to support the architecture of  the nose.   Once the cartilage had been freed from the vomer inferiorly,  the caudal end of the septum drifted back into the midline, further  deviated portions of septal cartilage and bone were removed with the  Itzel forceps. The hemitransfixion incision was then closed with  multiple sutures of 4-0 chromic catgut. Attention was then turned to  the inferior turbinates. Each inferior turbinate was cauterized with  suction cautery set at 40 spray to reduce the volume. The nose was  packed with Merocel sponges. The nose was taped and a mustache dressing  was put in place. The patient tolerated the procedure well and was  transferred to the recovery room in good condition.   Estimated blood  loss minimal.        Yeimy Levy MD    D: 12/08/2022 9:49:42       T: 12/08/2022 9:51:59     [email or web address]  Job#: [id number]     Doc#: [id number]    CC:

## 2022-12-08 NOTE — ANESTHESIA PRE PROCEDURE
Department of Anesthesiology  Preprocedure Note       Name:  Vivian Jackson   Age:  81 y.o.  :  1940                                          MRN:  [id number]         Date:  2022      Surgeon: Fouzia Neal): Sonja Dawn MD    Procedure: Procedure(s):  SEPTAL RECONSTRUCTION, BILATERAL INFERIOR TURBINATE RESECTION    Medications prior to admission:   Prior to Admission medications    Medication Sig Start Date End Date Taking?  Authorizing Provider   atorvastatin (LIPITOR) 20 MG tablet Take 20 mg by mouth daily   Yes Historical Provider, MD   cyclobenzaprine (FLEXERIL) 10 MG tablet as needed 22   Historical Provider, MD   OnabotulinumtoxinA, Cosmetic, 100 units SOLR Inject 100 Units into the skin once    Historical Provider, MD   trimethoprim (TRIMPEX) 100 MG tablet TAKE 1 TABLET BY MOUTH EVERY DAY 22   Historical Provider, MD   Cholecalciferol (VITAMIN D3) 50 MCG (2000) CAPS Take by mouth    Historical Provider, MD   Multiple Vitamins-Minerals (CENTRUM SILVER ULTRA WOMENS) TABS Take by mouth    Historical Provider, MD   aspirin 81 MG chewable tablet Take 81 mg by mouth daily    Historical Provider, MD   Omega-3 Fatty Acids (FISH OIL) 1000 MG CAPS Take 2,000 mg by mouth daily    Historical Provider, MD       Current medications:    Current Facility-Administered Medications   Medication Dose Route Frequency Provider Last Rate Last Admin    lidocaine PF 1 % injection 1 mL  1 mL IntraDERmal Once PRN Lakeshia Huerta MD        lactated ringers infusion   IntraVENous Continuous Lakeshia Huerta  mL/hr at 22 0752 NoRateChange at 22 0752    sodium chloride flush 0.9 % injection 5-40 mL  5-40 mL IntraVENous 2 times per day Lakeshia Huerta MD        sodium chloride flush 0.9 % injection 5-40 mL  5-40 mL IntraVENous PRN Lakeshia Huerta MD        0.9 % sodium chloride infusion   IntraVENous PRN Lakeshia Huerta MD           Allergies:  No Known Allergies    Problem List: Patient Active Problem List   Diagnosis Code    Syncope and collapse R55    Overactive bladder N32.81    Hyperlipidemia E78.5    SOB (shortness of breath) R06.02    Preop cardiovascular exam Z01.810       Past Medical History:        Diagnosis Date    Arthritis     Chronic back pain     Dizziness     History of blood transfusion     1972 [address]    Hyperlipidemia     Overactive bladder     Rash     Syncope and collapse     3 XS IN LAST 3 MONTHS,UNKNOWN ORIGIN       Past Surgical History:        Procedure Laterality Date    APPENDECTOMY      BACK SURGERY      JOINT REPLACEMENT Bilateral     KNee    KNEE SURGERY      bilat    NECK SURGERY         Social History:    Social History     Tobacco Use    Smoking status: Never    Smokeless tobacco: Never   Substance Use Topics    Alcohol use: Never                                Counseling given: Not Answered      Vital Signs (Current):   Vitals:    12/06/22 1520 12/08/22 0711   BP:  (!) 155/56   Pulse:  57   Resp:  16   Temp:  97.6 °F (36.4 °C)   TempSrc:  Temporal   SpO2:  95%   Weight: 208 lb (94.3 kg) 212 lb 9.6 oz (96.4 kg)   Height: 5' 4\" (1.626 m) 5' 4\" (1.626 m)                                              BP Readings from Last 3 Encounters:   12/08/22 (!) 155/56   12/07/22 120/68   11/16/22 112/63       NPO Status: Time of last liquid consumption: 1745                        Time of last solid consumption: 1745                        Date of last liquid consumption: 12/07/22                        Date of last solid food consumption: 12/07/22    BMI:   Wt Readings from Last 3 Encounters:   12/08/22 212 lb 9.6 oz (96.4 kg)   12/07/22 214 lb 9.6 oz (97.3 kg)   11/16/22 218 lb (98.9 kg)     Body mass index is 36.49 kg/m².     CBC:   Lab Results   Component Value Date/Time    WBC 5.9 07/30/2020 12:25 PM    RBC 4.30 07/30/2020 12:25 PM    HGB 13.9 07/30/2020 12:25 PM    HCT 41.5 07/30/2020 12:25 PM    MCV 96.4 07/30/2020 12:25 PM    RDW 13.9 07/30/2020 12:25 PM     07/30/2020 12:25 PM       CMP:   Lab Results   Component Value Date/Time     07/30/2020 12:23 PM    K 5.2 07/30/2020 12:23 PM     07/30/2020 12:23 PM    CO2 23 07/30/2020 12:23 PM    BUN 20 07/30/2020 12:23 PM    CREATININE 0.8 07/30/2020 12:23 PM    GFRAA >60 07/30/2020 12:23 PM    GFRAA >60 06/02/2010 11:51 AM    AGRATIO 1.6 07/30/2020 12:23 PM    LABGLOM >60 07/30/2020 12:23 PM    GLUCOSE 108 07/30/2020 12:23 PM    PROT 6.7 07/30/2020 12:23 PM    PROT 7.1 06/02/2010 11:51 AM    CALCIUM 9.3 07/30/2020 12:23 PM    BILITOT 1.1 07/30/2020 12:23 PM    ALKPHOS 53 07/30/2020 12:23 PM    AST 42 07/30/2020 12:23 PM    ALT 19 07/30/2020 12:23 PM       POC Tests: No results for input(s): POCGLU, POCNA, POCK, POCCL, POCBUN, POCHEMO, POCHCT in the last 72 hours. Coags:   Lab Results   Component Value Date/Time    PROTIME 11.0 10/20/2019 02:39 PM    INR 0.96 10/20/2019 02:39 PM       HCG (If Applicable): No results found for: PREGTESTUR, PREGSERUM, HCG, HCGQUANT     ABGs: No results found for: PHART, PO2ART, GXF8XAS, GMS9YJJ, BEART, I8UNYJNB     Type & Screen (If Applicable):  No results found for: LABABO, LABRH    Drug/Infectious Status (If Applicable):  No results found for: HIV, HEPCAB    COVID-19 Screening (If Applicable): No results found for: COVID19        Anesthesia Evaluation  Patient summary reviewed and Nursing notes reviewed  Airway: Mallampati: II  TM distance: >3 FB   Neck ROM: full  Mouth opening: > = 3 FB   Dental:          Pulmonary:   (+) shortness of breath:                             Cardiovascular:    (+) hyperlipidemia                  Neuro/Psych:   Negative Neuro/Psych ROS              GI/Hepatic/Renal: Neg GI/Hepatic/Renal ROS            Endo/Other:    (+) : arthritis:., .                 Abdominal:             Vascular: negative vascular ROS.          Other Findings:           Anesthesia Plan      general     ASA 3    (72-year-old female presents for SEPTAL RECONSTRUCTION, BILATERAL INFERIOR TURBINATE RESECTION. Plan general anesthesia with ASA standard monitors. Questions answered. Patient agreeable with anesthetic plan.  )  Induction: intravenous. Anesthetic plan and risks discussed with patient. Plan discussed with CRNA.     Attending anesthesiologist reviewed and agrees with Fredy Leach MD   12/8/2022

## 2022-12-09 ENCOUNTER — OFFICE VISIT (OUTPATIENT)
Dept: ENT CLINIC | Age: 82
End: 2022-12-09

## 2022-12-09 VITALS
RESPIRATION RATE: 14 BRPM | OXYGEN SATURATION: 99 % | DIASTOLIC BLOOD PRESSURE: 61 MMHG | HEART RATE: 64 BPM | WEIGHT: 215.6 LBS | SYSTOLIC BLOOD PRESSURE: 131 MMHG | TEMPERATURE: 97.1 F | BODY MASS INDEX: 37.01 KG/M2

## 2022-12-09 DIAGNOSIS — Z98.890 STATUS POST NASAL SURGERY: Primary | ICD-10-CM

## 2022-12-09 PROCEDURE — 99024 POSTOP FOLLOW-UP VISIT: CPT | Performed by: OTOLARYNGOLOGY

## 2022-12-09 NOTE — PROGRESS NOTES
Septal reconstruction with partial reduction of turbinates bilateral yesterday. Packing removed bilaterally. Both nares topically decongested with Afrin and anesthetized with lidocaine on cotton pledgets. Good airway bilateral.  Patient will use Afrin spray every 8 hours for 3 days as well as saline. Follow-up: 4 days.

## 2022-12-13 ENCOUNTER — OFFICE VISIT (OUTPATIENT)
Dept: ENT CLINIC | Age: 82
End: 2022-12-13

## 2022-12-13 VITALS
HEIGHT: 64 IN | TEMPERATURE: 96.9 F | OXYGEN SATURATION: 97 % | BODY MASS INDEX: 36.19 KG/M2 | SYSTOLIC BLOOD PRESSURE: 113 MMHG | DIASTOLIC BLOOD PRESSURE: 60 MMHG | WEIGHT: 212 LBS | HEART RATE: 64 BPM

## 2022-12-13 DIAGNOSIS — Z98.890 STATUS POST NASAL SURGERY: Primary | ICD-10-CM

## 2022-12-13 PROCEDURE — 99024 POSTOP FOLLOW-UP VISIT: CPT | Performed by: OTOLARYNGOLOGY

## 2022-12-16 ENCOUNTER — OFFICE VISIT (OUTPATIENT)
Dept: ENT CLINIC | Age: 82
End: 2022-12-16

## 2022-12-16 DIAGNOSIS — Z98.890 STATUS POST NASAL SURGERY: Primary | ICD-10-CM

## 2022-12-16 PROCEDURE — 99024 POSTOP FOLLOW-UP VISIT: CPT | Performed by: OTOLARYNGOLOGY

## 2022-12-16 NOTE — PROGRESS NOTES
Days following septal reconstruction with pressure reduction of both inferior turbinates. Airway is much improved. Still a little crusting from the suture site and the anterior septum on the left. Will continue to clean with nasal spray. I will see her back next week if she still has some crusting.

## 2022-12-27 ENCOUNTER — HOSPITAL ENCOUNTER (OUTPATIENT)
Dept: NON INVASIVE DIAGNOSTICS | Age: 82
Discharge: HOME | End: 2022-12-27
Payer: MEDICARE

## 2022-12-27 DIAGNOSIS — R06.02 SHORTNESS OF BREATH: ICD-10-CM

## 2022-12-27 PROCEDURE — 3430000000 HC RX DIAGNOSTIC RADIOPHARMACEUTICAL: Performed by: INTERNAL MEDICINE

## 2022-12-27 PROCEDURE — 93306 TTE W/DOPPLER COMPLETE: CPT

## 2022-12-27 PROCEDURE — A9502 TC99M TETROFOSMIN: HCPCS | Performed by: INTERNAL MEDICINE

## 2022-12-27 PROCEDURE — 6360000002 HC RX W HCPCS: Performed by: INTERNAL MEDICINE

## 2022-12-27 PROCEDURE — 78452 HT MUSCLE IMAGE SPECT MULT: CPT

## 2022-12-27 PROCEDURE — 93017 CV STRESS TEST TRACING ONLY: CPT

## 2022-12-27 RX ADMIN — REGADENOSON 0.4 MG: 0.08 INJECTION, SOLUTION INTRAVENOUS at 10:12

## 2022-12-27 RX ADMIN — TETROFOSMIN 30 MILLICURIE: 1.38 INJECTION, POWDER, LYOPHILIZED, FOR SOLUTION INTRAVENOUS at 10:12

## 2022-12-27 RX ADMIN — TETROFOSMIN 10 MILLICURIE: 1.38 INJECTION, POWDER, LYOPHILIZED, FOR SOLUTION INTRAVENOUS at 09:20

## 2022-12-29 ENCOUNTER — OFFICE VISIT (OUTPATIENT)
Dept: ENT CLINIC | Age: 82
End: 2022-12-29

## 2022-12-29 VITALS — BODY MASS INDEX: 35.84 KG/M2 | WEIGHT: 208.8 LBS

## 2022-12-29 DIAGNOSIS — Z98.890 STATUS POST NASAL SURGERY: Primary | ICD-10-CM

## 2022-12-29 PROCEDURE — 99024 POSTOP FOLLOW-UP VISIT: CPT | Performed by: OTOLARYNGOLOGY

## 2022-12-29 NOTE — PROGRESS NOTES
3 weeks following septal reconstruction with partial reduction of the inferior turbinates for nasal airway obstruction. The hemitransfixion incision is well-healed. There is still a little eschar along the left inferior turbinate. Patient given a nasal/sinus rinse. Follow-up: As needed.

## 2023-01-06 PROBLEM — Z01.810 PREOP CARDIOVASCULAR EXAM: Status: RESOLVED | Noted: 2022-12-07 | Resolved: 2023-01-06

## 2023-03-15 ASSESSMENT — ENCOUNTER SYMPTOMS
BLOOD IN STOOL: 0
COLOR CHANGE: 0
ABDOMINAL PAIN: 0
ABDOMINAL DISTENTION: 0
CHEST TIGHTNESS: 0
FACIAL SWELLING: 0
VOMITING: 0
BACK PAIN: 0
WHEEZING: 0
EYE DISCHARGE: 0
COUGH: 0

## 2023-03-15 NOTE — PROGRESS NOTES
(TTE,XANDER,CMR,cath) were reviewed. All questions and concerns were addressed to the patient/family. Alternatives to my treatment were discussed. The note was completed using EMR. Every effort wasmade to ensure accuracy; however, inadvertent computerized transcription errors may be present. Thank you for allowing me to participate in thecare or [address].  Alphonso Buerger, MD, [facility], [facility] [address]

## 2023-03-20 ENCOUNTER — OFFICE VISIT (OUTPATIENT)
Dept: CARDIOLOGY CLINIC | Age: 83
End: 2023-03-20
Payer: MEDICARE

## 2023-03-20 VITALS
BODY MASS INDEX: 36.42 KG/M2 | SYSTOLIC BLOOD PRESSURE: 120 MMHG | DIASTOLIC BLOOD PRESSURE: 70 MMHG | WEIGHT: 212.2 LBS | HEART RATE: 66 BPM | OXYGEN SATURATION: 96 %

## 2023-03-20 DIAGNOSIS — E78.5 HYPERLIPIDEMIA, UNSPECIFIED HYPERLIPIDEMIA TYPE: ICD-10-CM

## 2023-03-20 DIAGNOSIS — I35.1 NONRHEUMATIC AORTIC VALVE INSUFFICIENCY: ICD-10-CM

## 2023-03-20 PROCEDURE — 1123F ACP DISCUSS/DSCN MKR DOCD: CPT | Performed by: INTERNAL MEDICINE

## 2023-03-20 PROCEDURE — 99214 OFFICE O/P EST MOD 30 MIN: CPT | Performed by: INTERNAL MEDICINE

## 2023-03-20 ASSESSMENT — ENCOUNTER SYMPTOMS: SHORTNESS OF BREATH: 0

## 2023-03-20 NOTE — ASSESSMENT & PLAN NOTE
Mild to moderate. Feeling well overall. No shortness of breath.   Continue to monitor with serial echocardiograms every 2 years

## 2023-07-10 ENCOUNTER — OFFICE VISIT (OUTPATIENT)
Dept: ENT CLINIC | Age: 83
End: 2023-07-10
Payer: MEDICARE

## 2023-07-10 VITALS
TEMPERATURE: 97.7 F | WEIGHT: 218.4 LBS | HEIGHT: 65 IN | DIASTOLIC BLOOD PRESSURE: 66 MMHG | SYSTOLIC BLOOD PRESSURE: 119 MMHG | HEART RATE: 60 BPM | BODY MASS INDEX: 36.39 KG/M2

## 2023-07-10 DIAGNOSIS — J34.3 HYPERTROPHY OF NASAL TURBINATES: ICD-10-CM

## 2023-07-10 DIAGNOSIS — R49.9 HOARSENESS OR CHANGING VOICE: ICD-10-CM

## 2023-07-10 DIAGNOSIS — J34.89 NASAL OBSTRUCTION: Primary | ICD-10-CM

## 2023-07-10 DIAGNOSIS — J34.89 INTRANASAL SYNECHIAE: ICD-10-CM

## 2023-07-10 PROCEDURE — 31575 DIAGNOSTIC LARYNGOSCOPY: CPT | Performed by: OTOLARYNGOLOGY

## 2023-07-10 PROCEDURE — 99213 OFFICE O/P EST LOW 20 MIN: CPT | Performed by: OTOLARYNGOLOGY

## 2023-07-10 PROCEDURE — 1123F ACP DISCUSS/DSCN MKR DOCD: CPT | Performed by: OTOLARYNGOLOGY

## 2023-07-10 RX ORDER — IPRATROPIUM BROMIDE 21 UG/1
2 SPRAY, METERED NASAL 2 TIMES DAILY
Qty: 30 ML | Refills: 3 | Status: SHIPPED | OUTPATIENT
Start: 2023-07-10

## 2023-07-10 NOTE — PROGRESS NOTES
FOLLOW UP VISIT:    CHIEF COMPLAINT: Nasal obstruction, hoarseness. INTERIM HISTORY: Nasal obstruction. Longstanding. Seen November 2022 with the symptoms. Imaging of the paranasal sinuses was negative. Noted to have a deviated septum and hypertrophy of the inferior turbinates. The patient did a clinical trial of Afrin and her symptoms improved and for this reason we performed a support septal reconstruction 7 months ago. Now with return of nasal obstruction, bilateral, also has a concern of a hoarse voice.     PAST MEDICAL HISTORY:   Social History     Tobacco Use   Smoking Status Never   Smokeless Tobacco Never                                                    Social History     Substance and Sexual Activity   Alcohol Use Never                                                    Current Outpatient Medications:     atorvastatin (LIPITOR) 20 MG tablet, Take 20 mg by mouth daily, Disp: , Rfl:     cyclobenzaprine (FLEXERIL) 10 MG tablet, as needed, Disp: , Rfl:     OnabotulinumtoxinA, Cosmetic, 100 units SOLR, Inject 100 Units into the skin once, Disp: , Rfl:     trimethoprim (TRIMPEX) 100 MG tablet, TAKE 1 TABLET BY MOUTH EVERY DAY, Disp: , Rfl:     Cholecalciferol (VITAMIN D3) 50 MCG (2000 UT) CAPS, Take by mouth, Disp: , Rfl:     Multiple Vitamins-Minerals (CENTRUM SILVER ULTRA WOMENS) TABS, Take by mouth, Disp: , Rfl:                                                  Past Medical History:   Diagnosis Date    Arthritis     Chronic back pain     Dizziness     History of blood transfusion     [address]    Hyperlipidemia     Overactive bladder     Rash     Syncope and collapse     3 XS IN LAST 3 MONTHS,UNKNOWN ORIGIN                                                    Past Surgical History:   Procedure Laterality Date    APPENDECTOMY      BACK SURGERY      JOINT REPLACEMENT Bilateral     KNee    KNEE SURGERY      bilat    NECK SURGERY      SEPTOPLASTY N/A 12/8/2022    SEPTAL RECONSTRUCTION, BILATERAL

## 2023-12-04 ENCOUNTER — OFFICE VISIT (OUTPATIENT)
Dept: ENT CLINIC | Age: 83
End: 2023-12-04
Payer: MEDICARE

## 2023-12-04 VITALS
TEMPERATURE: 97.3 F | BODY MASS INDEX: 35.65 KG/M2 | WEIGHT: 214 LBS | RESPIRATION RATE: 16 BRPM | HEIGHT: 65 IN | DIASTOLIC BLOOD PRESSURE: 73 MMHG | HEART RATE: 63 BPM | SYSTOLIC BLOOD PRESSURE: 133 MMHG

## 2023-12-04 DIAGNOSIS — J34.89 NASAL OBSTRUCTION: Primary | ICD-10-CM

## 2023-12-04 PROCEDURE — 1123F ACP DISCUSS/DSCN MKR DOCD: CPT | Performed by: OTOLARYNGOLOGY

## 2023-12-04 PROCEDURE — 99213 OFFICE O/P EST LOW 20 MIN: CPT | Performed by: OTOLARYNGOLOGY

## 2023-12-04 NOTE — PROGRESS NOTES
FOLLOW UP VISIT:    CHIEF COMPLAINT: Nasal obstruction    INTERIM HISTORY: Seen 5 months ago with longstanding nasal obstruction and hoarseness. She was seen in November 2022 and noted to have a deviated septum and some turbinate hypertrophy she did a clinical trial with Afrin and her symptoms improved and for this reason we did septal and turbinate surgery in April 2022. On exam 4 months ago her septum was straight and her turbinates were edematous but she had a synechia between the septum and the lateral wall on the right. Endoscopic evaluation of her vocal cords looked normal.  I did place her on ipratropium bromide to see if this would help her nasal symptoms. Still concerned with nasal obstruction.       PAST MEDICAL HISTORY:   Social History     Tobacco Use   Smoking Status Never   Smokeless Tobacco Never                                                    Social History     Substance and Sexual Activity   Alcohol Use Never                                                    Current Outpatient Medications:     ipratropium (ATROVENT) 0.03 % nasal spray, 2 sprays by Each Nostril route 2 times daily, Disp: 30 mL, Rfl: 3    atorvastatin (LIPITOR) 20 MG tablet, Take 1 tablet by mouth daily, Disp: , Rfl:     cyclobenzaprine (FLEXERIL) 10 MG tablet, as needed, Disp: , Rfl:     OnabotulinumtoxinA, Cosmetic, 100 units SOLR, Inject 100 Units into the skin once, Disp: , Rfl:     Cholecalciferol (VITAMIN D3) 50 MCG (2000 UT) CAPS, Take by mouth, Disp: , Rfl:     Multiple Vitamins-Minerals (CENTRUM SILVER ULTRA WOMENS) TABS, Take by mouth, Disp: , Rfl:                                                  Past Medical History:   Diagnosis Date    Arthritis     Chronic back pain     Dizziness     History of blood transfusion     1972 [address] CHILDBIRTH    Hyperlipidemia     Kidney disease     Overactive bladder     Rash     Syncope and collapse     3 XS IN LAST 3 MONTHS,UNKNOWN ORIGIN

## 2023-12-05 ENCOUNTER — HOSPITAL ENCOUNTER (INPATIENT)
Age: 83
LOS: 2 days | Discharge: HOME | DRG: 880 | End: 2023-12-07
Attending: EMERGENCY MEDICINE | Admitting: INTERNAL MEDICINE
Payer: MEDICARE

## 2023-12-05 ENCOUNTER — APPOINTMENT (OUTPATIENT)
Dept: GENERAL RADIOLOGY | Age: 83
DRG: 880 | End: 2023-12-05
Payer: MEDICARE

## 2023-12-05 ENCOUNTER — TELEPHONE (OUTPATIENT)
Dept: ENT CLINIC | Age: 83
End: 2023-12-05

## 2023-12-05 ENCOUNTER — TELEPHONE (OUTPATIENT)
Dept: CARDIOLOGY CLINIC | Age: 83
End: 2023-12-05

## 2023-12-05 DIAGNOSIS — R07.9 CHEST PAIN, UNSPECIFIED TYPE: Primary | ICD-10-CM

## 2023-12-05 LAB
ANION GAP SERPL CALCULATED.3IONS-SCNC: 9 MMOL/L (ref 3–16)
BASOPHILS # BLD: 0.1 K/UL (ref 0–0.2)
BASOPHILS NFR BLD: 0.7 %
BUN SERPL-MCNC: 26 MG/DL (ref 7–20)
CALCIUM SERPL-MCNC: 9.5 MG/DL (ref 8.3–10.6)
CHLORIDE SERPL-SCNC: 106 MMOL/L (ref 99–110)
CO2 SERPL-SCNC: 26 MMOL/L (ref 21–32)
CREAT SERPL-MCNC: 0.9 MG/DL (ref 0.6–1.2)
DEPRECATED RDW RBC AUTO: 13.3 % (ref 12.4–15.4)
EKG ATRIAL RATE: 60 BPM
EKG DIAGNOSIS: NORMAL
EKG P AXIS: 78 DEGREES
EKG P-R INTERVAL: 208 MS
EKG Q-T INTERVAL: 402 MS
EKG QRS DURATION: 90 MS
EKG QTC CALCULATION (BAZETT): 402 MS
EKG R AXIS: 13 DEGREES
EKG T AXIS: 59 DEGREES
EKG VENTRICULAR RATE: 60 BPM
EOSINOPHIL # BLD: 0.2 K/UL (ref 0–0.6)
EOSINOPHIL NFR BLD: 3.1 %
GFR SERPLBLD CREATININE-BSD FMLA CKD-EPI: >60 ML/MIN/{1.73_M2}
GLUCOSE SERPL-MCNC: 111 MG/DL (ref 70–99)
HCT VFR BLD AUTO: 41.8 % (ref 36–48)
HGB BLD-MCNC: 13.8 G/DL (ref 12–16)
LYMPHOCYTES # BLD: 1.7 K/UL (ref 1–5.1)
LYMPHOCYTES NFR BLD: 24.2 %
MCH RBC QN AUTO: 31.4 PG (ref 26–34)
MCHC RBC AUTO-ENTMCNC: 33.1 G/DL (ref 31–36)
MCV RBC AUTO: 95 FL (ref 80–100)
MONOCYTES # BLD: 0.5 K/UL (ref 0–1.3)
MONOCYTES NFR BLD: 6.6 %
NEUTROPHILS # BLD: 4.5 K/UL (ref 1.7–7.7)
NEUTROPHILS NFR BLD: 65.4 %
NT-PROBNP SERPL-MCNC: 283 PG/ML (ref 0–449)
PLATELET # BLD AUTO: 204 K/UL (ref 135–450)
PMV BLD AUTO: 8.7 FL (ref 5–10.5)
POTASSIUM SERPL-SCNC: 4.4 MMOL/L (ref 3.5–5.1)
RBC # BLD AUTO: 4.4 M/UL (ref 4–5.2)
SODIUM SERPL-SCNC: 141 MMOL/L (ref 136–145)
TROPONIN, HIGH SENSITIVITY: 18 NG/L (ref 0–14)
TROPONIN, HIGH SENSITIVITY: 19 NG/L (ref 0–14)
WBC # BLD AUTO: 6.8 K/UL (ref 4–11)

## 2023-12-05 PROCEDURE — 6370000000 HC RX 637 (ALT 250 FOR IP): Performed by: STUDENT IN AN ORGANIZED HEALTH CARE EDUCATION/TRAINING PROGRAM

## 2023-12-05 PROCEDURE — 1200000000 HC SEMI PRIVATE

## 2023-12-05 PROCEDURE — 84484 ASSAY OF TROPONIN QUANT: CPT

## 2023-12-05 PROCEDURE — 93010 ELECTROCARDIOGRAM REPORT: CPT | Performed by: INTERNAL MEDICINE

## 2023-12-05 PROCEDURE — 99285 EMERGENCY DEPT VISIT HI MDM: CPT

## 2023-12-05 PROCEDURE — 36415 COLL VENOUS BLD VENIPUNCTURE: CPT

## 2023-12-05 PROCEDURE — 83880 ASSAY OF NATRIURETIC PEPTIDE: CPT

## 2023-12-05 PROCEDURE — 85025 COMPLETE CBC W/AUTO DIFF WBC: CPT

## 2023-12-05 PROCEDURE — 71045 X-RAY EXAM CHEST 1 VIEW: CPT

## 2023-12-05 PROCEDURE — 93005 ELECTROCARDIOGRAM TRACING: CPT | Performed by: EMERGENCY MEDICINE

## 2023-12-05 PROCEDURE — 80048 BASIC METABOLIC PNL TOTAL CA: CPT

## 2023-12-05 PROCEDURE — G0378 HOSPITAL OBSERVATION PER HR: HCPCS

## 2023-12-05 RX ORDER — ASPIRIN 81 MG/1
324 TABLET, CHEWABLE ORAL ONCE
Status: COMPLETED | OUTPATIENT
Start: 2023-12-05 | End: 2023-12-05

## 2023-12-05 RX ADMIN — ASPIRIN 324 MG: 81 TABLET, CHEWABLE ORAL at 19:15

## 2023-12-05 ASSESSMENT — PAIN DESCRIPTION - PAIN TYPE: TYPE: ACUTE PAIN

## 2023-12-05 ASSESSMENT — PAIN DESCRIPTION - ORIENTATION: ORIENTATION: RIGHT;LEFT

## 2023-12-05 ASSESSMENT — PAIN - FUNCTIONAL ASSESSMENT
PAIN_FUNCTIONAL_ASSESSMENT: 0-10
PAIN_FUNCTIONAL_ASSESSMENT: PREVENTS OR INTERFERES SOME ACTIVE ACTIVITIES AND ADLS

## 2023-12-05 ASSESSMENT — PAIN DESCRIPTION - LOCATION: LOCATION: CHEST;SHOULDER

## 2023-12-05 ASSESSMENT — PAIN DESCRIPTION - DESCRIPTORS: DESCRIPTORS: ACHING

## 2023-12-05 ASSESSMENT — PAIN SCALES - GENERAL: PAINLEVEL_OUTOF10: 6

## 2023-12-05 NOTE — ED PROVIDER NOTES
[facility] RESIDENT NOTE       Date of evaluation: 12/5/2023    Chief Complaint     Chest Pain (Chest pain and shortness of breath, tingling in right hand, shoulder. Had an EKG done this AM at 1100, was recommended to report here but had to go home to care for , now presenting with same symptoms. Onset of symptoms 10 days ago.)      History of Present Illness     Bam Palacios is a 81 y.o. female with a past medical history significant for hyperlipidemia, CKD who presents to the emergency department for evaluation of chest pain. Patient states that over the last 2 weeks she has been experiencing intermittent substernal chest discomfort with intermittent episodes of pain in her bilateral upper extremities. She has also been experiencing paresthesias in the right third through fifth fingertips. She was seen by her cardiologist in the outpatient setting today in which she had a reassuring EKG, however was instructed to present to the emergency department secondary to the recurrent nature of her discomfort. On arrival, she rates her symptoms as mild to moderate in severity. She states that she has been experiencing increased generalized weakness over the last several weeks secondary to lack of sleep which has been attributed to an overactive bladder. She has not experienced any dyspnea on exertion, orthopnea, leg pain/tenderness or swelling, cough, abdominal pain, or any other acute concerns at this time. Review of Systems   A complete review of systems was conducted on this patient and was negative except as noted in the HPI. Past Medical, Surgical, Family, and Social History     She has a past medical history of Arthritis, Chronic back pain, Dizziness, History of blood transfusion, Hyperlipidemia, Kidney disease, Overactive bladder, Rash, and Syncope and collapse.   She has a past surgical history that includes Neck surgery; back surgery; Surgical Hx, Social Hx, Allergies, and Family Hx were reviewed. The patient was given the followingmedications:  Orders Placed This Encounter   Medications    aspirin chewable tablet 324 mg    atorvastatin (LIPITOR) tablet 20 mg    sodium chloride flush 0.9 % injection 5-40 mL    sodium chloride flush 0.9 % injection 5-40 mL    0.9 % sodium chloride infusion    magnesium sulfate 2000 mg in 50 mL IVPB premix    OR Linked Order Group     ondansetron (ZOFRAN-ODT) disintegrating tablet 4 mg     ondansetron (ZOFRAN) injection 4 mg    OR Linked Order Group     acetaminophen (TYLENOL) tablet 650 mg     acetaminophen (TYLENOL) suppository 650 mg    polyethylene glycol (GLYCOLAX) packet 17 g    aspirin chewable tablet 81 mg    DISCONTD: lactated ringers IV soln infusion    OR Linked Order Group     potassium chloride (KLOR-CON M) extended release tablet 40 mEq     potassium bicarb-citric acid (EFFER-K) effervescent tablet 40 mEq     potassium chloride 10 mEq/100 mL IVPB (Peripheral Line)    nitroGLYCERIN (NITROSTAT) SL tablet 0.4 mg    enoxaparin (LOVENOX) injection 40 mg     Order Specific Question:   Indication of Use     Answer:   Prophylaxis-DVT/PE    morphine (PF) injection 2 mg    labetalol (NORMODYNE;TRANDATE) injection 5 mg    hydrALAZINE (APRESOLINE) injection 5 mg    iopamidol (ISOVUE-370) 76 % injection 75 mL    lactated ringers IV soln infusion    technetium tetrofosmin (Tc-MYOVIEW) injection 10 millicurie       CONSULTS:  IP CONSULT TO CARDIOLOGY  IP CONSULT TO [address] [place] [address] / ASSESSMENT / Jammie Hammans is a 81 y.o. female with a past medical history significant for hyperlipidemia, CKD who presents to the emergency department for evaluation of chest pain.     ED Course as of 12/06/23 1411   Tue Dec 05, 2023   1637 CBC with Auto Differential:    WBC 6.8   RBC 4.40   Hemoglobin Quant 13.8   Hematocrit 41.8   MCV 95.0   MCH 31.4   MCHC 33.1   RDW 13.3   Platelet Count 224   MPV

## 2023-12-05 NOTE — ED PROVIDER NOTES
ED Attending Attestation Note     Date of evaluation: 12/5/2023    This patient was seen by the resident. I have seen and examined the patient, agree with the workup, evaluation, management and diagnosis. The care plan has been discussed. Reviewed the EKG and agree with the findings as documented by the resident physician. Briefly, Fabiola Cerda is a 81 y.o. female with a PMH inclusive of HLD, aortic insufficiency who presents for evaluation of chest pain x 2 weeks, intermittent. Notable exam findings include normal mental status    Assessment/ Medical Decision Making:     Patient with left arm achiness and intermittent chest pain. EKG without acute ischemic changes however troponin mildly elevated and patient with risk factors for ACS. Differential diagnosis also includes radiculopathy however given her risk factors and persistent symptoms over the last 2 weeks, will admit for cardiac monitoring, serial troponins, cardiology evaluation.           Aman Mendez MD  12/05/23 0611

## 2023-12-05 NOTE — TELEPHONE ENCOUNTER
EKG essentially unchanged when compared to previous one.  Please set up follow-up with CG if chest pain persists

## 2023-12-05 NOTE — TELEPHONE ENCOUNTER
Patient was in office for an ekg with chest pain today please review and contact pt with additional information    No

## 2023-12-05 NOTE — TELEPHONE ENCOUNTER
Spoke with patient and let her know Dr. Esquivel reviewed the EKG and it is unchanged. Patient is going to check into the ER for breathing issues and arm pain.

## 2023-12-06 ENCOUNTER — APPOINTMENT (OUTPATIENT)
Dept: CT IMAGING | Age: 83
DRG: 880 | End: 2023-12-06
Payer: MEDICARE

## 2023-12-06 ENCOUNTER — APPOINTMENT (OUTPATIENT)
Dept: VASCULAR LAB | Age: 83
DRG: 880 | End: 2023-12-06
Payer: MEDICARE

## 2023-12-06 DIAGNOSIS — R07.9 CHEST PAIN, UNSPECIFIED TYPE: Primary | ICD-10-CM

## 2023-12-06 DIAGNOSIS — I35.1 NONRHEUMATIC AORTIC VALVE INSUFFICIENCY: ICD-10-CM

## 2023-12-06 DIAGNOSIS — R79.89 ELEVATED TROPONIN: ICD-10-CM

## 2023-12-06 PROBLEM — F80.81 STUTTER: Status: ACTIVE | Noted: 2023-12-06

## 2023-12-06 LAB
CHOLEST SERPL-MCNC: 143 MG/DL (ref 0–199)
EKG ATRIAL RATE: 57 BPM
EKG DIAGNOSIS: NORMAL
EKG P AXIS: 74 DEGREES
EKG P-R INTERVAL: 208 MS
EKG Q-T INTERVAL: 450 MS
EKG QRS DURATION: 100 MS
EKG QTC CALCULATION (BAZETT): 438 MS
EKG R AXIS: 6 DEGREES
EKG T AXIS: 110 DEGREES
EKG VENTRICULAR RATE: 57 BPM
GLUCOSE BLD-MCNC: 85 MG/DL (ref 70–99)
HDLC SERPL-MCNC: 54 MG/DL (ref 40–60)
LDLC SERPL CALC-MCNC: 73 MG/DL
PERFORMED ON: NORMAL
TRIGL SERPL-MCNC: 80 MG/DL (ref 0–150)
VLDLC SERPL CALC-MCNC: 16 MG/DL

## 2023-12-06 PROCEDURE — 96374 THER/PROPH/DIAG INJ IV PUSH: CPT

## 2023-12-06 PROCEDURE — 6370000000 HC RX 637 (ALT 250 FOR IP): Performed by: INTERNAL MEDICINE

## 2023-12-06 PROCEDURE — G0378 HOSPITAL OBSERVATION PER HR: HCPCS

## 2023-12-06 PROCEDURE — 93010 ELECTROCARDIOGRAM REPORT: CPT | Performed by: INTERNAL MEDICINE

## 2023-12-06 PROCEDURE — 99222 1ST HOSP IP/OBS MODERATE 55: CPT

## 2023-12-06 PROCEDURE — 70498 CT ANGIOGRAPHY NECK: CPT

## 2023-12-06 PROCEDURE — 2580000003 HC RX 258: Performed by: INTERNAL MEDICINE

## 2023-12-06 PROCEDURE — 36415 COLL VENOUS BLD VENIPUNCTURE: CPT

## 2023-12-06 PROCEDURE — 6360000004 HC RX CONTRAST MEDICATION

## 2023-12-06 PROCEDURE — 93005 ELECTROCARDIOGRAM TRACING: CPT | Performed by: INTERNAL MEDICINE

## 2023-12-06 PROCEDURE — 70450 CT HEAD/BRAIN W/O DYE: CPT

## 2023-12-06 PROCEDURE — 6360000002 HC RX W HCPCS: Performed by: INTERNAL MEDICINE

## 2023-12-06 PROCEDURE — 78452 HT MUSCLE IMAGE SPECT MULT: CPT

## 2023-12-06 PROCEDURE — 96372 THER/PROPH/DIAG INJ SC/IM: CPT

## 2023-12-06 PROCEDURE — 51798 US URINE CAPACITY MEASURE: CPT

## 2023-12-06 PROCEDURE — 93017 CV STRESS TEST TRACING ONLY: CPT

## 2023-12-06 PROCEDURE — 6370000000 HC RX 637 (ALT 250 FOR IP): Performed by: STUDENT IN AN ORGANIZED HEALTH CARE EDUCATION/TRAINING PROGRAM

## 2023-12-06 PROCEDURE — 3430000000 HC RX DIAGNOSTIC RADIOPHARMACEUTICAL: Performed by: INTERNAL MEDICINE

## 2023-12-06 PROCEDURE — 1200000000 HC SEMI PRIVATE

## 2023-12-06 PROCEDURE — 93880 EXTRACRANIAL BILAT STUDY: CPT

## 2023-12-06 PROCEDURE — A9502 TC99M TETROFOSMIN: HCPCS | Performed by: INTERNAL MEDICINE

## 2023-12-06 PROCEDURE — 99222 1ST HOSP IP/OBS MODERATE 55: CPT | Performed by: INTERNAL MEDICINE

## 2023-12-06 PROCEDURE — 80061 LIPID PANEL: CPT

## 2023-12-06 RX ORDER — SODIUM CHLORIDE 0.9 % (FLUSH) 0.9 %
5-40 SYRINGE (ML) INJECTION EVERY 12 HOURS SCHEDULED
Status: DISCONTINUED | OUTPATIENT
Start: 2023-12-06 | End: 2023-12-07 | Stop reason: HOSPADM

## 2023-12-06 RX ORDER — POTASSIUM CHLORIDE 20 MEQ/1
40 TABLET, EXTENDED RELEASE ORAL PRN
Status: DISCONTINUED | OUTPATIENT
Start: 2023-12-06 | End: 2023-12-07 | Stop reason: HOSPADM

## 2023-12-06 RX ORDER — ONDANSETRON 4 MG/1
4 TABLET, ORALLY DISINTEGRATING ORAL EVERY 8 HOURS PRN
Status: DISCONTINUED | OUTPATIENT
Start: 2023-12-06 | End: 2023-12-07 | Stop reason: HOSPADM

## 2023-12-06 RX ORDER — SODIUM CHLORIDE, SODIUM LACTATE, POTASSIUM CHLORIDE, CALCIUM CHLORIDE 600; 310; 30; 20 MG/100ML; MG/100ML; MG/100ML; MG/100ML
INJECTION, SOLUTION INTRAVENOUS CONTINUOUS
Status: DISCONTINUED | OUTPATIENT
Start: 2023-12-06 | End: 2023-12-06

## 2023-12-06 RX ORDER — POLYETHYLENE GLYCOL 3350 17 G/17G
17 POWDER, FOR SOLUTION ORAL DAILY PRN
Status: DISCONTINUED | OUTPATIENT
Start: 2023-12-06 | End: 2023-12-07 | Stop reason: HOSPADM

## 2023-12-06 RX ORDER — POTASSIUM CHLORIDE 7.45 MG/ML
10 INJECTION INTRAVENOUS PRN
Status: DISCONTINUED | OUTPATIENT
Start: 2023-12-06 | End: 2023-12-07 | Stop reason: HOSPADM

## 2023-12-06 RX ORDER — SODIUM CHLORIDE 9 MG/ML
INJECTION, SOLUTION INTRAVENOUS PRN
Status: DISCONTINUED | OUTPATIENT
Start: 2023-12-06 | End: 2023-12-07 | Stop reason: HOSPADM

## 2023-12-06 RX ORDER — NITROGLYCERIN 0.4 MG/1
0.4 TABLET SUBLINGUAL EVERY 5 MIN PRN
Status: DISCONTINUED | OUTPATIENT
Start: 2023-12-06 | End: 2023-12-07 | Stop reason: HOSPADM

## 2023-12-06 RX ORDER — LABETALOL HYDROCHLORIDE 5 MG/ML
5 INJECTION, SOLUTION INTRAVENOUS EVERY 6 HOURS PRN
Status: DISCONTINUED | OUTPATIENT
Start: 2023-12-06 | End: 2023-12-07 | Stop reason: HOSPADM

## 2023-12-06 RX ORDER — ACETAMINOPHEN 325 MG/1
650 TABLET ORAL EVERY 6 HOURS PRN
Status: DISCONTINUED | OUTPATIENT
Start: 2023-12-06 | End: 2023-12-07 | Stop reason: HOSPADM

## 2023-12-06 RX ORDER — MAGNESIUM SULFATE IN WATER 40 MG/ML
2000 INJECTION, SOLUTION INTRAVENOUS PRN
Status: DISCONTINUED | OUTPATIENT
Start: 2023-12-06 | End: 2023-12-07 | Stop reason: HOSPADM

## 2023-12-06 RX ORDER — ENOXAPARIN SODIUM 100 MG/ML
40 INJECTION SUBCUTANEOUS DAILY
Status: DISCONTINUED | OUTPATIENT
Start: 2023-12-06 | End: 2023-12-07 | Stop reason: HOSPADM

## 2023-12-06 RX ORDER — LISINOPRIL 5 MG/1
5 TABLET ORAL DAILY
Status: DISCONTINUED | OUTPATIENT
Start: 2023-12-06 | End: 2023-12-07 | Stop reason: HOSPADM

## 2023-12-06 RX ORDER — ACETAMINOPHEN 650 MG/1
650 SUPPOSITORY RECTAL EVERY 6 HOURS PRN
Status: DISCONTINUED | OUTPATIENT
Start: 2023-12-06 | End: 2023-12-07 | Stop reason: HOSPADM

## 2023-12-06 RX ORDER — ONDANSETRON 2 MG/ML
4 INJECTION INTRAMUSCULAR; INTRAVENOUS EVERY 6 HOURS PRN
Status: DISCONTINUED | OUTPATIENT
Start: 2023-12-06 | End: 2023-12-07 | Stop reason: HOSPADM

## 2023-12-06 RX ORDER — ASPIRIN 81 MG/1
81 TABLET, CHEWABLE ORAL DAILY
Status: DISCONTINUED | OUTPATIENT
Start: 2023-12-06 | End: 2023-12-07 | Stop reason: HOSPADM

## 2023-12-06 RX ORDER — REGADENOSON 0.08 MG/ML
0.4 INJECTION, SOLUTION INTRAVENOUS
Status: COMPLETED | OUTPATIENT
Start: 2023-12-06 | End: 2023-12-06

## 2023-12-06 RX ORDER — MORPHINE SULFATE 2 MG/ML
2 INJECTION, SOLUTION INTRAMUSCULAR; INTRAVENOUS EVERY 4 HOURS PRN
Status: DISCONTINUED | OUTPATIENT
Start: 2023-12-06 | End: 2023-12-07 | Stop reason: HOSPADM

## 2023-12-06 RX ORDER — HYDRALAZINE HYDROCHLORIDE 20 MG/ML
5 INJECTION INTRAMUSCULAR; INTRAVENOUS EVERY 6 HOURS PRN
Status: DISCONTINUED | OUTPATIENT
Start: 2023-12-06 | End: 2023-12-07 | Stop reason: HOSPADM

## 2023-12-06 RX ORDER — SODIUM CHLORIDE, SODIUM LACTATE, POTASSIUM CHLORIDE, CALCIUM CHLORIDE 600; 310; 30; 20 MG/100ML; MG/100ML; MG/100ML; MG/100ML
INJECTION, SOLUTION INTRAVENOUS CONTINUOUS
Status: ACTIVE | OUTPATIENT
Start: 2023-12-06 | End: 2023-12-06

## 2023-12-06 RX ORDER — ATORVASTATIN CALCIUM 20 MG/1
20 TABLET, FILM COATED ORAL DAILY
Status: DISCONTINUED | OUTPATIENT
Start: 2023-12-06 | End: 2023-12-07 | Stop reason: HOSPADM

## 2023-12-06 RX ORDER — SODIUM CHLORIDE 0.9 % (FLUSH) 0.9 %
5-40 SYRINGE (ML) INJECTION PRN
Status: DISCONTINUED | OUTPATIENT
Start: 2023-12-06 | End: 2023-12-07 | Stop reason: HOSPADM

## 2023-12-06 RX ADMIN — SODIUM CHLORIDE, PRESERVATIVE FREE 10 ML: 5 INJECTION INTRAVENOUS at 01:02

## 2023-12-06 RX ADMIN — ACETAMINOPHEN 650 MG: 325 TABLET ORAL at 12:50

## 2023-12-06 RX ADMIN — REGADENOSON 0.4 MG: 0.08 INJECTION, SOLUTION INTRAVENOUS at 15:00

## 2023-12-06 RX ADMIN — TETROFOSMIN 10 MILLICURIE: 1.38 INJECTION, POWDER, LYOPHILIZED, FOR SOLUTION INTRAVENOUS at 13:20

## 2023-12-06 RX ADMIN — LISINOPRIL 5 MG: 10 TABLET ORAL at 18:09

## 2023-12-06 RX ADMIN — IOPAMIDOL 75 ML: 755 INJECTION, SOLUTION INTRAVENOUS at 02:23

## 2023-12-06 RX ADMIN — TETROFOSMIN 30 MILLICURIE: 1.38 INJECTION, POWDER, LYOPHILIZED, FOR SOLUTION INTRAVENOUS at 15:00

## 2023-12-06 RX ADMIN — SODIUM CHLORIDE, PRESERVATIVE FREE 10 ML: 5 INJECTION INTRAVENOUS at 20:19

## 2023-12-06 RX ADMIN — ATORVASTATIN CALCIUM 20 MG: 20 TABLET, FILM COATED ORAL at 20:19

## 2023-12-06 RX ADMIN — HYDRALAZINE HYDROCHLORIDE 5 MG: 20 INJECTION, SOLUTION INTRAMUSCULAR; INTRAVENOUS at 01:04

## 2023-12-06 RX ADMIN — SODIUM CHLORIDE, POTASSIUM CHLORIDE, SODIUM LACTATE AND CALCIUM CHLORIDE: 600; 310; 30; 20 INJECTION, SOLUTION INTRAVENOUS at 01:03

## 2023-12-06 RX ADMIN — ENOXAPARIN SODIUM 40 MG: 100 INJECTION SUBCUTANEOUS at 09:09

## 2023-12-06 RX ADMIN — ASPIRIN 81 MG: 81 TABLET, CHEWABLE ORAL at 09:10

## 2023-12-06 RX ADMIN — SODIUM CHLORIDE, PRESERVATIVE FREE 10 ML: 5 INJECTION INTRAVENOUS at 09:10

## 2023-12-06 ASSESSMENT — ENCOUNTER SYMPTOMS
RESPIRATORY NEGATIVE: 1
EYES NEGATIVE: 1
SINUS PAIN: 1
SINUS PRESSURE: 1
SHORTNESS OF BREATH: 0
GASTROINTESTINAL NEGATIVE: 1
COUGH: 0
CHEST TIGHTNESS: 0

## 2023-12-06 ASSESSMENT — PAIN SCALES - GENERAL
PAINLEVEL_OUTOF10: 0
PAINLEVEL_OUTOF10: 3
PAINLEVEL_OUTOF10: 0

## 2023-12-06 ASSESSMENT — PAIN - FUNCTIONAL ASSESSMENT: PAIN_FUNCTIONAL_ASSESSMENT: PREVENTS OR INTERFERES SOME ACTIVE ACTIVITIES AND ADLS

## 2023-12-06 ASSESSMENT — PAIN DESCRIPTION - LOCATION: LOCATION: SHOULDER

## 2023-12-06 ASSESSMENT — PAIN DESCRIPTION - DESCRIPTORS: DESCRIPTORS: ACHING;SORE

## 2023-12-06 ASSESSMENT — PAIN DESCRIPTION - ORIENTATION: ORIENTATION: LEFT

## 2023-12-06 NOTE — PROGRESS NOTES
Code stroke was called because the pt started to stutter when she would talk. The pt was last known to be well at 0111 am after she was reassessed from her PRN Hydralazine at that time she had no neuro deficits, now her speech is stuttered, per pt she has a history of this happening when stressed, MD's at the [facility] stroke aware of this.  Alfonso Salas RN

## 2023-12-06 NOTE — PROGRESS NOTES
Hospital Medicine Progress Note      Date of Admission: 12/5/2023  Hospital Day: 2    Chief Admission Complaint: Intermittent chest discomfort     Subjective: Patient was seen and evaluated at bedside. Reported resolution of chest discomfort. Reported ongoing numbness and tingling involving the left upper extremity. Presenting Admission History:     81 y.o. female with pmh anxiety, dyslipidemia, chronic back/neck pain, chronic fissures, and intermittent stutter presents with complaints of intermittent chest pain with associated shortness of breath, tingling in the left hand and the shoulder. Patient states that symptoms have been going on for the past 2 weeks. Denies prior history of MI, personal history of CAD. Patient was admitted for observation and troponins were trended and found to be negative. EKG did not show any new ST-T wave changes. Patient had a stress test done in December 2022 which was overall low risk. Cardiology consulted and patient underwent Lexiscan and carotid Dopplers. During the hospital stay patient had an episode of word finding pauses and a stroke alert was called. Stat CT head and CT angiogram of the head and neck were performed which were negative. Neurology was consulted who did not recommend any further testing since the patient's symptoms are likely anxiety related, recommended outpatient follow-up with established neurologist.  Follow-up with neurovascular surgery for carotid aneurysm, and no further workup.     Assessment/Plan:      Chest pain  intermittent stutter  Hypertension  Dyslipidemia  Carotid aneurysm    Plan:  *Intermittent chest pain resolved  No new ST-T wave changes on EKG  Troponins have been trended x 2 and have been negative  On low-dose aspirin and moderate intensity statin  Cardiology ordered Lexiscan stress test and carotid ultrasound    *Stroke alert called for concern for dysarthria  Has underlying history of intermittent stutter and follows with

## 2023-12-06 NOTE — ED NOTES
ED TO INPATIENT SBAR HANDOFF    Patient Name: Fabiola Cerda   :  1940  81 y.o. MRN:  [id number]  Preferred Name    ED Room #:  V44/G70-91  Family/Caregiver Present no   Restraints no   Sitter no   Sepsis Risk Score Sepsis Risk Score: 0.38    Situation  Code Status: Prior No additional code details. Allergies: Patient has no known allergies. Weight: Patient Vitals for the past 96 hrs (Last 3 readings):   Weight   23 1429 97.1 kg (214 lb 1.6 oz)     Arrived from: home  Chief Complaint:   Chief Complaint   Patient presents with    Chest Pain     Chest pain and shortness of breath, tingling in right hand, shoulder. Had an EKG done this AM at 1100, was recommended to report here but had to go home to care for , now presenting with same symptoms. Onset of symptoms 10 days ago. Hospital Problem/Diagnosis:  Principal Problem:    Chest pain  Resolved Problems:    * No resolved hospital problems.  *    Imaging:   XR CHEST PORTABLE   Final Result        Abnormal labs:   Abnormal Labs Reviewed   BASIC METABOLIC PANEL W/ REFLEX TO MG FOR LOW K - Abnormal; Notable for the following components:       Result Value    Glucose 111 (*)     BUN 26 (*)     All other components within normal limits   TROPONIN - Abnormal; Notable for the following components:    Troponin, High Sensitivity 19 (*)     All other components within normal limits   TROPONIN - Abnormal; Notable for the following components:    Troponin, High Sensitivity 18 (*)     All other components within normal limits     Critical values: no     Abnormal Assessment Findings:     Background  History:   Past Medical History:   Diagnosis Date    Arthritis     Chronic back pain     Dizziness     History of blood transfusion      W CHILDBIRTH    Hyperlipidemia     Kidney disease     Overactive bladder     Rash     Syncope and collapse     3 XS IN LAST 3 MONTHS,UNKNOWN ORIGIN       Assessment    Vitals/MEWS: MEWS Score: 1  Level of Consciousness:

## 2023-12-06 NOTE — CARE COORDINATION
Case Management Assessment  Initial Evaluation    Date/Time of Evaluation: 12/6/2023 1:16 PM  Assessment Completed by: DORIAN Prince, KARENW    If patient is discharged prior to next notation, then this note serves as note for discharge by case management. Patient Name: Ismael Grewal                   YOB: 1940  Diagnosis: Chest pain [R07.9]                   Date / Time: 12/5/2023  4:21 PM    Patient Admission Status: Inpatient   Readmission Risk (Low < 19, Mod (19-27), High > 27): Readmission Risk Score: 5.6    Current PCP: Mendez Pennington, DO  PCP verified by CM? No    Chart Reviewed: Yes      History Provided by: Patient  Patient Orientation: Alert and Oriented    Patient Cognition: Alert    Hospitalization in the last 30 days (Readmission):  No    If yes, Readmission Assessment in CM Navigator will be completed. Advance Directives:      Code Status: Full Code   Patient's Primary Decision Maker is: Legal Next of Kin      Discharge Planning:    Patient lives with: Spouse/Significant Other Type of Home: House  Primary Care Giver: Self  Patient Support Systems include: Spouse/Significant Other, Children, Family Members   Current Financial resources: Medicare  Current community resources:    Current services prior to admission: Durable Medical Equipment            Current DME: Shower Chair, Walker            Type of Home Care services:       ADLS  Prior functional level: Independent in ADLs/IADLs  Current functional level: Independent in ADLs/IADLs    PT AM-PAC:   /24  OT AM-PAC:   /24    Family can provide assistance at DC: Yes  Would you like Case Management to discuss the discharge plan with any other family members/significant others, and if so, who?  No  Plans to Return to Present Housing: Yes  Other Identified Issues/Barriers to RETURNING to current housing: med stability   Potential Assistance needed at discharge: Home Care            Potential DME:    Patient expects to discharge to: Agree with the Discharge Plan?  Yes    DORIAN Newberry, [address] [facility]  Case Management Department  Ph: [phone]

## 2023-12-06 NOTE — PROGRESS NOTES
Pt's speech is back to baseline at this time, no stuttering has been noted. Increased the pt's IVF rate per MD order, see MAR. Pt was changed due to urinary incontinence, becca care done, pure wick placed.  Mac Darnell RN

## 2023-12-06 NOTE — CONSULTS
[address]  Cardiology Inpatient Consult Service                                                                                          Pt Name: Ángel Ruffin  Age: 81 y.o. Sex: female  : 1940  Location: [place] [address]    Referring Physician: Erendira Welch MD      Reason for Consult:       Reason for Consultation/Chief Complaint: Chest pain      HPI:      Ángel Ruffin is a 81 y.o. female with a past medical history of hyperlipidemia, overactive bladder, and diabetes who presented to the hospital after receiving an EKG at primary care's office. Patient states that she has been having tingling that has been going on in her fingertips as well as an associated left-sided chest pain that radiates to her upper shoulder and has been going on since October as well. She states her primary care physician has wanted to do EKG but she has been postponing it due to taking care of her . She had a stress test done on 2022 which showed low risk scan. Patient states that the chest pain comes and goes several times a week and always resolves on its own. She denies any shortness of breath associated with chest pain. She denies any fever, chills, nausea, vomiting, abdominal pain, or diarrhea. Histories     Past Medical History:   has a past medical history of Arthritis, Chronic back pain, Dizziness, History of blood transfusion, Hyperlipidemia, Kidney disease, Overactive bladder, Rash, and Syncope and collapse. Surgical History:   has a past surgical history that includes Neck surgery; back surgery; knee surgery; Appendectomy; joint replacement (Bilateral); and Septoplasty (N/A, 2022). Social History:   reports that she has never smoked. She has never used smokeless tobacco. She reports that she does not drink alcohol and does not use drugs.      Family History:  No evidence for sudden cardiac death or premature CAD      Medications:       Home 72 hours. Invalid input(s): \"PT\"  No results for input(s): \"CKTOTAL\", \"CKMB\", \"CKMBINDEX\", \"TROPONINI\" in the last 72 hours. No results for input(s): \"BNP\" in the last 72 hours. No results for input(s): \"TSH\" in the last 72 hours. No results for input(s): \"CHOL\", \"HDL\", \"LDLCALC\", \"TRIG\" in the last 72 hours.]    Lab Results   Component Value Date    TROPONINI <0.01 05/06/2020       Lab Results   Component Value Date/Time    TROPHS 18 12/05/2023 04:32 PM       Imaging:     I personally reviewed imaging studies including CXR, Stress test, TTE/ANEL. Last ECG (if available) -personally interpreted EKG:  I have reviewed EKG with the following interpretation NSR, no ST-T changes. Telemetry: Personally interpreted      Last Stress (if available): 12/27/2022 Overall findings represent a low risk scan. There is normal isotope uptake at stress and rest. There is no evidence of myocardial ischemia or scar. Normal LV size and systolic function. Normal myocardial perfusion study. Non-diagnostic EKG response due to failure to reach target heart rate. Last Echo: 12/27/2022  Left ventricular cavity size is normal. There is mildly increased left   ventricular wall thickness. Overall left ventricular systolic function appears normal. Ejection fraction   is visually estimated to be 60-65%. No regional wall motion abnormalities are noted. Diastolic filling   parameters suggest grade I diastolic dysfunction. Mild to Moderate aortic regurgitation. Assessment / Plan:     Chest pain  Patient has been having on and off left-sided chest pain for about 2 months. Stress test 12/2022 with a low risk scan. -EKG showed inverted T waves in V3, V4, V5, V6.   No signs of ischemia.  -High-sensitivity troponins were 19 and a repeat 18  -proBNP 283  -Continue aspirin 81 mg and statin  -Follow-up hemoglobin A1c and lipid panel  -Recommend Lexiscan and bilateral carotid duplex     Hyperlipidemia  -Continue Statin     Thank you for allowing to us to participate in the care or Ismael Grewal. Further evaluation will be based upon the patient's clinical course and testing results. All questions and concerns were addressed to the patient/family. Alternatives to my treatment were discussed. Patient has been staffed with Dr. Festus Yañez MD.    Kayden Doran,  PGY-2  Internal Medicine Resident       Staff Note      Patient seen and evaluated with the medical resident. I was physically present during the critical portions of the service when performed by the resident including the assessment and management of the patient. Atypical chest pain. Will check myoview lexiscan and carotid dopplers. I agree with the findings and plans as described.

## 2023-12-06 NOTE — H&P
cardiopulmonary disease      History from:     patient    History of Present Illness:     Chief Complaint: Chest pain  Steph Duvall is a 81 y.o. female with pmh as mentioned above presents with complaints of chest pain with associated shortness of breath, tingling in the right hand and the shoulder. Patient was pressure-like in nature and was radiating to her left arm as well. Patient states that she had an EKG done earlier this morning around 11 AM and was recommended to report to ED. But patient had to go home to care for her  and presented to ED. Patient states that symptoms have been going on for the past 2 weeks. Denies prior history of MI, personal history of CAD    Denies fever, chills, orthopnea, paroxysmal nocturnal dyspnea, cough, palpitations, diaphoresis, nausea and vomiting, abdominal pain, leg swelling    Review of Systems:        Pertinent positives and negatives discussed in HPI     Objective:   No intake or output data in the 24 hours ending 12/05/23 1929   Vitals:   Vitals:    12/05/23 1621 12/05/23 1706 12/05/23 1850 12/05/23 1855   BP: (!) 155/84 (!) 178/75 133/61    Pulse: 64 64  63   Resp:  14  15   Temp:       TempSrc:       SpO2: 99% 98%  100%   Weight:       Height:           Medications Prior to Admission     Prior to Admission medications    Medication Sig Start Date End Date Taking?  Authorizing Provider   ipratropium (ATROVENT) 0.03 % nasal spray 2 sprays by Each Nostril route 2 times daily 7/10/23   Elma Alanis MD   atorvastatin (LIPITOR) 20 MG tablet Take 1 tablet by mouth daily    ProviderTheresa MD   cyclobenzaprine (FLEXERIL) 10 MG tablet as needed 9/14/22   ProviderTheresa MD   OnabotulinumtoxinA, Cosmetic, 100 units SOLR Inject 100 Units into the skin once    Theresa Montejo MD   Cholecalciferol (VITAMIN D3) 50 MCG (2000 UT) CAPS Take by mouth    Theresa Montejo MD   Multiple Vitamins-Minerals (CENTRUM SILVER ULTRA WOMENS) TABS Take by mouth    Provider, Theresa, MD       Physical Exam:        General: NAD, afebrile, not on supplemental oxygen  Eyes: EOMI  ENT: neck supple  Cardiovascular: Regular rate. Respiratory: Clear to auscultation  Gastrointestinal: Soft, non tender  Genitourinary: no suprapubic tenderness  Musculoskeletal: No edema. No musculoskeletal tenderness over the left chest  Skin: warm, dry  Neuro: Alert. Awake. Oriented x4  Psych: Mood appropriate. Past Medical History:   PMHx   Past Medical History:   Diagnosis Date    Arthritis     Chronic back pain     Dizziness     History of blood transfusion     1972 [address]    Hyperlipidemia     Kidney disease     Overactive bladder     Rash     Syncope and collapse     3 XS IN LAST 3 MONTHS,UNKNOWN ORIGIN     PSHX:  has a past surgical history that includes Neck surgery; back surgery; knee surgery; Appendectomy; joint replacement (Bilateral); and Septoplasty (N/A, 12/8/2022). Allergies: No Known Allergies  Fam HX: family history includes Depression in her maternal grandmother; Diabetes in her father and mother; Heart Disease in her father and paternal grandfather. Soc HX:   Social History     Socioeconomic History    Marital status:    Tobacco Use    Smoking status: Never    Smokeless tobacco: Never   Vaping Use    Vaping Use: Never used   Substance and Sexual Activity    Alcohol use: Never    Drug use: Never       Medications:   Medications: Foot jaundice  Infusions: LR infusion  PRN Meds: Refer to orders    Labs      CBC:   Recent Labs     12/05/23  1454   WBC 6.8   HGB 13.8        BMP:    Recent Labs     12/05/23  1454      K 4.4      CO2 26   BUN 26*   CREATININE 0.9   GLUCOSE 111*     Hepatic: No results for input(s): \"AST\", \"ALT\", \"ALB\", \"BILITOT\", \"ALKPHOS\" in the last 72 hours.   Lipids:   Lab Results   Component Value Date/Time    CHOL 177 07/24/2019 02:27 PM    HDL 62 07/24/2019 02:27 PM    HDL 50 06/02/2010 11:51 AM    TRIG 153 07/24/2019 02:27 PM     Hemoglobin A1C:   Lab Results   Component Value Date/Time    LABA1C 5.6 06/02/2010 11:51 AM     TSH:   Lab Results   Component Value Date/Time    TSH 1.34 06/02/2010 11:51 AM     Troponin: No results found for: \"TROPONINT\"  Lactic Acid: No results for input(s): \"LACTA\" in the last 72 hours. BNP:   Recent Labs     12/05/23  1613   PROBNP 283     UA:  Lab Results   Component Value Date/Time    NITRU Negative 07/30/2020 12:22 PM    COLORU Yellow 07/30/2020 12:22 PM    PHUR 6.0 07/30/2020 12:22 PM    WBCUA 10-20 07/30/2020 12:22 PM    RBCUA 0-2 07/30/2020 12:22 PM    MUCUS 1+ 07/30/2020 12:22 PM    BACTERIA 4+ 07/30/2020 12:22 PM    CLARITYU SL CLOUDY 07/30/2020 12:22 PM    SPECGRAV 1.020 07/30/2020 12:22 PM    LEUKOCYTESUR LARGE 07/30/2020 12:22 PM    UROBILINOGEN 0.2 07/30/2020 12:22 PM    BILIRUBINUR Negative 07/30/2020 12:22 PM    BLOODU TRACE-LYSED 07/30/2020 12:22 PM    GLUCOSEU Negative 07/30/2020 12:22 PM    Danielle Innocent 07/30/2020 12:22 PM     Urine Cultures:   Lab Results   Component Value Date/Time    LABURIN  07/30/2020 11:44 AM     <10,000 CFU/ml mixed skin/urogenital segundo. No further workup     Blood Cultures: No results found for: \"BC\"  No results found for: \"BLOODCULT2\"  Organism:   Lab Results   Component Value Date/Time    ORG Escherichia coli 05/06/2020 02:43 PM       Imaging/Diagnostics Last 24 Hours   XR CHEST PORTABLE    Result Date: 12/5/2023  EXAM: PORTABLE AP CHEST X-RAY, 1721 INDICATION: chest pain COMPARISON: 5/6/2020 FINDINGS: Trachea is midline Mild aortic tortuosity Heart is normal in size Lungs are clear without active pulmonary opacities or effusion. 1. No acute cardiopulmonary abnormalities 2. No active airspace disease.         Electronically signed by Héctor Butler MD on 12/5/2023 at 7:29 PM

## 2023-12-06 NOTE — CONSULTS
Neurocritical Care Consult Note      Patient: Cecy Fraga MRN: [id number]    YOB: 1940  Age: 81 y.o. Sex: female   Unit: [facility] EMERGENCY DEPT  Room/Bed: C29/C29-29 Location: [address] [place] [address]    Date of Consultation: 12/6/2023  Date of Admission: 12/5/2023  4:21 PM ( LOS: 1 day )  Primary Care Physician: Rk Lopez DO   Consult Requested By: Alfredo Aragon MD    Reason for Consult: code stroke for stuttering and expressive aphasia    IMPRESSION & RECOMMENDATIONS     IMPRESSION:  Katie Johnson is an 82 yo female who presents for increased stuttering and slight word finding difficulty. Patient has a known history of facial tremor with intermittent stutter she is treated for with  neurology. On exam, patient is oriented x4, strong in all 4 extremities, with a slight stutter and mild word finding difficulty. Patient states she's had an episode like this in the past, and says her symptoms are improved from last night. Concern this is stress related, and not stroke given history and non-focal exam.     RECOMMENDATIONS:  -follow up with outpatient neurology (Dr. Bao Mayberry) as scheduled  -outpatient mental health on discharge  -f/u with outpatient neurovascular surgery for carotid aneurysm  -no further neurological work up    Management and plan discussed with:   Bedside nurse  ALVINA Wood - [facility]   Neurocritical Care   12/6/2023 10:08 AM  [employer]: [facility] Neurocritical Care      History of Present Illness     Cecy Fraga is a 81 y.o. y/o female with history significant for HLD, overactive bladder, syncope, and facial tremor. Per my interview with the patient she has had a facial tremor with intermittent stutter for a number of years and gets botox injections at [facility] neurology for it. Patient states this is stress related, and will increase in severity depending on her stress levels. Per chart review, patient came in to the ED for chest pain.  EKG was nonischemic and she had a slightly elevated troponin that is now down-trending. While in the ED, a code stroke was called for intermittent stuttering and reported aphasia. CT head shows no acute abnormality or mass effect. CTA head/neck shows a 2mm posterior projecting aneurysm from the R carotid terminus, and no LVO or flow limiting stenosis. Patient states she has had an episode of similar to this in the past.     History provided by:  Patient and chart review    Review of data from external sources including:  [facility]    REVIEW OF SYSTEMS:   Constitutional- No weight loss or fevers   Neurologic- No headache. No vision changes. No focal motor/sensory deficit.  C/o mild word finding difficulty, stutter, and facial tremors    Past Medical, Surgical, Family, and Social History   PAST MEDICAL HISTORY:  Past Medical History:   Diagnosis Date    Arthritis     Chronic back pain     Dizziness     History of blood transfusion     1972 [address]    Hyperlipidemia     Kidney disease     Overactive bladder     Rash     Syncope and collapse     3 XS IN LAST 3 MONTHS,UNKNOWN ORIGIN     SURGICAL HISTORY:  Past Surgical History:   Procedure Laterality Date    APPENDECTOMY      BACK SURGERY      JOINT REPLACEMENT Bilateral     KNee    KNEE SURGERY      bilat    NECK SURGERY      SEPTOPLASTY N/A 12/8/2022    SEPTAL RECONSTRUCTION, BILATERAL INFERIOR TURBINATE RESECTION performed by Yuliet Flores MD at [address]:  Family history non-contributory  Family History   Problem Relation Age of Onset    Diabetes Mother     Heart Disease Father     Diabetes Father     Depression Maternal Grandmother     Heart Disease Paternal Grandfather      Social History     Tobacco Use    Smoking status: Never    Smokeless tobacco: Never   Vaping Use    Vaping Use: Never used   Substance Use Topics    Alcohol use: Never    Drug use: Never       Allergies & Outpatient Medications   ALLERGIES:  No Known Allergies  HOME

## 2023-12-06 NOTE — SIGNIFICANT EVENT
Arrived for CODE Stroke to find patient intermittently stuttering. No definite aphasia or dysarthria. She reports she's had these symptoms in the past related to stress. The remainder of her exam is non-focal and reassuring. Instructed primary team to contact  Stroke Team who by report did not recommend intervention due to non-disabling symptoms. Will check HCT and CT-A (out of caution, very low suspicion for LVO). If imaging is unrevealing, would request routine neurology consult in AM.   If imaging is abnormal, will reassess at that time. On review of head CT I do not see any ICH or hypodensity concerning for stroke. CT-A images are not yet available for review. Formal reads are pending on both.     D/w ICU Senior and Dr. Orlando Agosto NP  Neurology

## 2023-12-06 NOTE — PROGRESS NOTES
Report handed off to oncoming RN, all questions answered, pt is back to her baseline with no stuttering. Pt's call light is in reach and bed alarm is on.  Alethea Rodgers RN

## 2023-12-06 NOTE — PROGRESS NOTES
CODE STROKE        Code stroke called for dysarthria at around 1:55am .    History: 80 yo female in observation in the ED for chest pain. Patient reports that she does not have a history of hypertension and is on no home BP meds. Patient also reported that she has a history of getting dysarthria when she is stressed. She also presumably gets botox for that. Vitals stable; BP elevated with 's. Blood glucose 85     NIHSS 1 for dysarthria. Patient alert and oriented. No other neurological deficits, no weakness, numbness, CN intact.  stroke team updated  Hospitalist and Neuro NP present at bedside. STAT CT head wo contrast and CTA head and neck ordered.       Ibis Mobley MD  PGY-1, Internal Medicine  12/06/23  2:23 AM

## 2023-12-07 VITALS
OXYGEN SATURATION: 96 % | DIASTOLIC BLOOD PRESSURE: 68 MMHG | SYSTOLIC BLOOD PRESSURE: 151 MMHG | WEIGHT: 210.76 LBS | HEIGHT: 64 IN | HEART RATE: 56 BPM | TEMPERATURE: 98 F | RESPIRATION RATE: 18 BRPM | BODY MASS INDEX: 35.98 KG/M2

## 2023-12-07 PROBLEM — R07.9 CHEST PAIN: Status: RESOLVED | Noted: 2023-12-05 | Resolved: 2023-12-07

## 2023-12-07 PROBLEM — F80.81 STUTTER: Status: RESOLVED | Noted: 2023-12-06 | Resolved: 2023-12-07

## 2023-12-07 PROCEDURE — 6370000000 HC RX 637 (ALT 250 FOR IP): Performed by: INTERNAL MEDICINE

## 2023-12-07 PROCEDURE — G0378 HOSPITAL OBSERVATION PER HR: HCPCS

## 2023-12-07 PROCEDURE — 6370000000 HC RX 637 (ALT 250 FOR IP): Performed by: STUDENT IN AN ORGANIZED HEALTH CARE EDUCATION/TRAINING PROGRAM

## 2023-12-07 PROCEDURE — 2580000003 HC RX 258: Performed by: INTERNAL MEDICINE

## 2023-12-07 RX ORDER — LISINOPRIL 5 MG/1
5 TABLET ORAL DAILY
Qty: 30 TABLET | Refills: 0 | Status: SHIPPED | OUTPATIENT
Start: 2023-12-07 | End: 2024-01-06

## 2023-12-07 RX ORDER — LISINOPRIL 5 MG/1
5 TABLET ORAL DAILY
Qty: 30 TABLET | Refills: 3 | Status: SHIPPED | OUTPATIENT
Start: 2023-12-07 | End: 2023-12-07 | Stop reason: SDUPTHER

## 2023-12-07 RX ADMIN — SODIUM CHLORIDE, PRESERVATIVE FREE 10 ML: 5 INJECTION INTRAVENOUS at 08:51

## 2023-12-07 RX ADMIN — LISINOPRIL 5 MG: 10 TABLET ORAL at 08:51

## 2023-12-07 RX ADMIN — ASPIRIN 81 MG: 81 TABLET, CHEWABLE ORAL at 08:51

## 2023-12-07 NOTE — CARE COORDINATION
Case Management Assessment            Discharge Note                    Date / Time of Note: 12/7/2023 12:33 PM                  Discharge Note Completed by: DORIAN Collins    Patient Name: Thaddeus Calloway   YOB: 1940  Diagnosis: Chest pain [R07.9]  Chest pain, unspecified type [R07.9]   Date / Time: 12/5/2023  4:21 PM    Current PCP: Daniel Jimnees DO  Clinic patient: No    Hospitalization in the last 30 days: No       Advance Directives:  Code Status: Full Code  [place] DNR form completed and on chart: No    Financial:  Payor: Randell Medrano / Plan: Navneet Bonilla PPO / Product Type: Medicare /      Pharmacy:    [facility], [place] [phone] - F [address] [place] [address]  Phone: [phone] Fax: [phone]  [address] [phone] - f [phone]  [address] [address]  Phone: [phone] Fax: [phone]      Assistance purchasing medications?:    Assistance provided by Case Management: None at this time    Does patient want to participate in local refill/ meds to beds program?:      Meds To Beds General Rules:  1. Can ONLY be done Monday- Friday between 8:30am-5pm  2. Prescription(s) must be in pharmacy by 3pm to be filled same day  3. Copy of patient's insurance/ prescription drug card and patient face sheet must be sent along with the prescription(s)  4. Cost of Rx cannot be added to hospital bill. If financial assistance is needed, please contact unit  or ;  or  CANNOT provide pharmacy voucher for patients co-pays  5.  Patients can then  the prescription on their way out of the hospital at discharge, or pharmacy can deliver to the bedside if staff is available. (payment due at time of pick-up or delivery - cash, check, or card accepted)     Able to afford home medications/ co-pay costs: Yes    ADLS:  Current PT AM-PAC Score:   /24  Current OT AM-PAC Score:   /24      DISCHARGE Disposition: Home- No Services Needed    LOC at discharge: Not Applicable  NATACHA Completed: Not Indicated    Notification completed in HENS/PAS?:  Not Applicable    IMM Completed:   Not Indicated due to: length of stay         Transportation:  Transportation PLAN for discharge: family   Mode of Transport: Private Car  Reason for medical transport: Not Applicable  Name of Transport Company: Not Applicable  Time of Transport: n/a    Transport form completed: Not Indicated    Home Care:  Home Care ordered at discharge: Not [address]: Not Applicable  Orders faxed: No    Referrals made at [facility] for outpatient continued care:  Not Applicable    Additional CM Notes: CM met with pt at bedside. Pt is from home with , independent pta. Pt will return home at discharge and  will transport. Pt has lots of family support and is still actively working as a travel planner. No CM needs at discharge. COVID Result:  No results found for: \"COVID19\"    The Plan for Transition of Care is related to the following treatment goals of Chest pain [R07.9]  Chest pain, unspecified type [R07.9]    The Patient and/or patient representative Iron Currie and her family were provided with a choice of provider and agrees with the discharge plan Yes    Freedom of choice list was provided with basic dialogue that supports the patient's individualized plan of care/goals and shares the quality data associated with the providers.  Yes    Care Transitions patient: No    DORIAN Valderrama  [facility] [employer]  Case Management Department  Ph: [phone]  Fax: [phone]

## 2023-12-07 NOTE — PROGRESS NOTES
Received pt from ED to room 6325. A/Ox4. Belongings at bedside. However, pt requested a female nurse. Informed Charge Nurse Salvador Jackson of care ongoing.

## 2023-12-07 NOTE — PROGRESS NOTES
Patient d/c to floor, per transport. All belongings, and walker, with patient and report called to receiving RN.

## 2023-12-07 NOTE — PROGRESS NOTES
4 Eyes Skin Assessment     NAME:  Raquel Hogan  YOB: 1940  MEDICAL RECORD NUMBER:  [id number]    The patient is being assessed for  Admission    I agree that at least one RN has performed a thorough Head to Toe Skin Assessment on the patient. ALL assessment sites listed below have been assessed. Areas assessed by both nurses:    Head, Face, Ears, Shoulders, Back, Chest, Arms, Elbows, Hands, Sacrum. Buttock, Coccyx, Ischium, Legs. Feet and Heels, and Under Medical Devices         Does the Patient have a Wound?  No noted wound(s)       Clement Prevention initiated by RN: Yes  Wound Care Orders initiated by RN: Yes    Pressure Injury (Stage 3,4, Unstageable, DTI, NWPT, and Complex wounds) if present, place Wound referral order by RN under : No    New Ostomies, if present place, Ostomy referral order under : No     Nurse 1 eSignature: Electronically signed by Ozzie Gomez RN on 12/6/23 at 10:19 PM EST    **SHARE this note so that the co-signing nurse can place an eSignature**    Nurse 2 eSignature: Electronically signed by Nevin Gold RN on 12/7/23 at 1:13 AM EST

## 2023-12-07 NOTE — PLAN OF CARE
Problem: Safety - Adult  Goal: Free from fall injury  Outcome: Progressing  Flowsheets (Taken 12/7/2023 0201)  Free From Fall Injury: Instruct family/caregiver on patient safety  Note: Call light in place. Informed about the bed alarm. Bed in the lowest level. Problem: Discharge Planning  Goal: Discharge to home or other facility with appropriate resources  Outcome: Progressing  Flowsheets (Taken 12/7/2023 0201)  Discharge to home or other facility with appropriate resources:   Identify barriers to discharge with patient and caregiver   Identify discharge learning needs (meds, wound care, etc)   Refer to discharge planning if patient needs post-hospital services based on physician order or complex needs related to functional status, cognitive ability or social support system   Arrange for needed discharge resources and transportation as appropriate  Note: Pt is expected to go home at [place]. Plan of care ongoing.

## 2023-12-07 NOTE — PROGRESS NOTES
Pt agreed to have a male nurse for tonight. However, she requested a female nurse to take care of diaper change and incontinent care. Charge Nurse Minna agreed to take care of those.

## 2023-12-07 NOTE — DISCHARGE SUMMARY
V2.0  Discharge Summary    Name:  Esvin Bedoya /Age/Sex: 1940 (81 y.o. female)   Admit Date: 2023  Discharge Date: 23    MRN & CSN:  [id number] & [id number] Encounter Date and Time 23 7:44 AM EST    Attending:  Natasha Thomson MD Discharging Provider: Natasha Thomson MD       Hospital Course:     Brief HPI: Esvin Bedoya is a 81 y.o. female with pmh anxiety, dyslipidemia, chronic back/neck pain, chronic fissures, and intermittent stutter presents with complaints of intermittent chest pain with associated shortness of breath, tingling in the left hand and the shoulder. Patient states that symptoms have been going on for the past 2 weeks. Denies prior history of MI, personal history of CAD. Patient was admitted for observation and troponins were trended and found to be negative. EKG did not show any new ST-T wave changes. Patient had a stress test done in 2022 which was overall low risk. Cardiology consulted and patient underwent Lexiscan and carotid Dopplers. Carotid Doppler showed<50% stenosis of bilateral ICA. Elisa scan was negative. During the hospital stay patient had an episode of word finding pauses and a stroke alert was called. Stat CT head and CT angiogram of the head and neck were performed which were negative for acute findings. CTA of the neck showed a small aneurysm of the carotid terminus on the right. Neurology was consulted who did not recommend any further testing since the patient's symptoms are likely anxiety related, recommended outpatient follow-up with established neurologist.  Follow-up with neurovascular surgery for carotid aneurysm, and no further workup.     Brief Problem Based Course:   Chest pain  intermittent stutter  Hypertension  Dyslipidemia  Carotid aneurysm    Plan:  *Intermittent chest pain resolved  No new ST-T wave changes on EKG  Troponins have been trended x 2 and have been negative  A1c 5.6, lipid panel within normal limits  On Technically difficult study due to patient movement and bilateral tortuous internal carotid arteries. The right internal carotid artery appears to have a <50% diameter reducing stenosis based on velocity criteria. The right vertebral artery demonstrates normal antegrade flow. The right subclavian artery is visualized and demonstrates multiphasic flow. Left Impression The left internal carotid artery appears to have a <50% diameter reducing stenosis based on velocity criteria. The left vertebral artery demonstrates normal antegrade flow. The left subclavian artery is visualized and demonstrates multiphasic flow. No significant changes from study performed on 10/21/2019. Conclusions   Summary   There is <50% stenosis of the bilateral internal carotid arteries. There is antegrade flow in the bilateral vertebral arteries. Signature   ------------------------------------------------------------------  Electronically signed by Judson Velasquez MD (Interpreting physician)  on 12/06/2023 at 04:58 PM  ------------------------------------------------------------------  Patient Status:ER. Study [address] - Vascular Lab. Technical Quality:Limited visualization due to movement. Plaque   - A plaque was found in the Right Dist CCA. The plaque characteristics are: minimal severity and heterogeneous texture. - A plaque was found in the Right ICA. There is evidence of tortuosity.   - A plaque was found in the Left Prox ICA. The plaque characteristics are: minimal severity and heterogeneous texture. - A plaque was found in the Left ICA. There is evidence of tortuosity. Velocities are measured in cm/s ; Diameters are measured in mm Carotid Right Measurements +---------------+----+----+-----+----+ ! Location       ! PSV ! EDV ! Angle! RI  ! +---------------+----+----+-----+----+ ! Prox CCA       !71. 5!10. 5!60   !0.85! +---------------+----+----+-----+----+ ! Mid CCA        !63.1!11. 9!60   !0.81!

## 2023-12-14 ENCOUNTER — TELEPHONE (OUTPATIENT)
Dept: ENT CLINIC | Age: 83
End: 2023-12-14

## 2023-12-14 NOTE — TELEPHONE ENCOUNTER
Pt followed your instructions with the afrin and strips, they did alleviate the congestion. If she does not continue to use them the congestion comes back. Please call the pt to  advise if she should come in for a F/U. Thank you

## 2023-12-29 NOTE — PROGRESS NOTES
General: No tenderness. Normal range of motion.      Cervical back: Normal range of motion.   Lymphadenopathy:      Cervical: No cervical adenopathy.   Skin:     General: Skin is warm and dry.      Findings: No erythema or rash.   Neurological:      Mental Status: She is alert and oriented to person, place, and time.      Coordination: Coordination normal.   Psychiatric:         Behavior: Behavior normal.         Thought Content: Thought content normal.         Judgment: Judgment normal.         Labs:       Lab Results   Component Value Date    WBC 6.8 12/05/2023    HGB 13.8 12/05/2023    HCT 41.8 12/05/2023    MCV 95.0 12/05/2023     12/05/2023     Lab Results   Component Value Date     12/05/2023    K 4.4 12/05/2023     12/05/2023    CO2 26 12/05/2023    BUN 26 (H) 12/05/2023    CREATININE 0.9 12/05/2023    GLUCOSE 111 (H) 12/05/2023    CALCIUM 9.5 12/05/2023    PROT 6.7 07/30/2020    LABALBU 4.1 07/30/2020    BILITOT 1.1 (H) 07/30/2020    ALKPHOS 53 07/30/2020    AST 42 (H) 07/30/2020    ALT 19 07/30/2020    LABGLOM >60 12/05/2023    GFRAA >60 07/30/2020    AGRATIO 1.6 07/30/2020    GLOB 2.6 07/30/2020         Lab Results   Component Value Date    CHOL 143 12/06/2023    CHOL 177 07/24/2019    CHOL 174 06/02/2010     Lab Results   Component Value Date    TRIG 80 12/06/2023    TRIG 153 (H) 07/24/2019    TRIG 116 06/02/2010     Lab Results   Component Value Date    HDL 54 12/06/2023    HDL 62 (H) 07/24/2019    HDL 50 06/02/2010     Lab Results   Component Value Date    LDLCALC 73 12/06/2023    LDLCALC 84 07/24/2019    LDLCALC 100 (H) 06/02/2010     Lab Results   Component Value Date    LABVLDL 16 12/06/2023    LABVLDL 31 07/24/2019    LABVLDL 23 06/02/2010     No results found for: \"CHOLHDLRATIO\"    Lab Results   Component Value Date    INR 0.96 10/20/2019    PROTIME 11.0 10/20/2019       The ASCVD Risk score (Luis Miguel POSADA, et al., 2019) failed to calculate for the following reasons:    The 2019

## 2024-01-08 ENCOUNTER — OFFICE VISIT (OUTPATIENT)
Dept: CARDIOLOGY CLINIC | Age: 84
End: 2024-01-08
Payer: MEDICARE

## 2024-01-08 VITALS
BODY MASS INDEX: 35.69 KG/M2 | WEIGHT: 214.2 LBS | SYSTOLIC BLOOD PRESSURE: 130 MMHG | HEART RATE: 74 BPM | OXYGEN SATURATION: 95 % | HEIGHT: 65 IN | DIASTOLIC BLOOD PRESSURE: 76 MMHG

## 2024-01-08 DIAGNOSIS — I35.1 NONRHEUMATIC AORTIC VALVE INSUFFICIENCY: ICD-10-CM

## 2024-01-08 DIAGNOSIS — E78.2 MIXED HYPERLIPIDEMIA: ICD-10-CM

## 2024-01-08 DIAGNOSIS — I10 PRIMARY HYPERTENSION: Primary | ICD-10-CM

## 2024-01-08 PROCEDURE — 1123F ACP DISCUSS/DSCN MKR DOCD: CPT | Performed by: INTERNAL MEDICINE

## 2024-01-08 PROCEDURE — 99214 OFFICE O/P EST MOD 30 MIN: CPT | Performed by: INTERNAL MEDICINE

## 2024-01-08 PROCEDURE — 3075F SYST BP GE 130 - 139MM HG: CPT | Performed by: INTERNAL MEDICINE

## 2024-01-08 PROCEDURE — 3078F DIAST BP <80 MM HG: CPT | Performed by: INTERNAL MEDICINE

## 2024-01-10 ENCOUNTER — OFFICE VISIT (OUTPATIENT)
Dept: ENT CLINIC | Age: 84
End: 2024-01-10
Payer: MEDICARE

## 2024-01-10 VITALS
BODY MASS INDEX: 36.15 KG/M2 | WEIGHT: 217 LBS | HEART RATE: 71 BPM | TEMPERATURE: 96.9 F | SYSTOLIC BLOOD PRESSURE: 122 MMHG | HEIGHT: 65 IN | DIASTOLIC BLOOD PRESSURE: 59 MMHG

## 2024-01-10 DIAGNOSIS — J34.89 NASAL OBSTRUCTION: Primary | ICD-10-CM

## 2024-01-10 PROCEDURE — 1123F ACP DISCUSS/DSCN MKR DOCD: CPT | Performed by: OTOLARYNGOLOGY

## 2024-01-10 PROCEDURE — 99212 OFFICE O/P EST SF 10 MIN: CPT | Performed by: OTOLARYNGOLOGY

## 2024-01-10 PROCEDURE — 3074F SYST BP LT 130 MM HG: CPT | Performed by: OTOLARYNGOLOGY

## 2024-01-10 PROCEDURE — 3078F DIAST BP <80 MM HG: CPT | Performed by: OTOLARYNGOLOGY

## 2024-01-10 RX ORDER — OXYMETAZOLINE HYDROCHLORIDE 0.05 G/100ML
2 SPRAY NASAL 2 TIMES DAILY
COMMUNITY

## 2024-01-10 RX ORDER — LISINOPRIL 5 MG/1
5 TABLET ORAL DAILY
COMMUNITY

## 2024-01-10 RX ORDER — IPRATROPIUM BROMIDE 21 UG/1
2 SPRAY, METERED NASAL 2 TIMES DAILY
Qty: 30 ML | Refills: 3 | Status: SHIPPED | OUTPATIENT
Start: 2024-01-10

## 2024-01-10 NOTE — PROGRESS NOTES
FOLLOW UP VISIT:    CHIEF COMPLAINT: Nasal obstruction.    INTERIM HISTORY: History of nasal obstruction clear rhinorrhea bilateral.  I performed septal and turbinate surgery December 2022.  The patient did well for several months but the nasal obstruction has returned.  In trying to differentiate between or turbinate hypertrophy and nasal valve collapse I asked her to use Afrin spray for a few nights and also to use Breathe Right strips for a few nights.  She found improvement in both.  She is currently using Afrin spray intermittently.      PAST MEDICAL HISTORY:   Social History     Tobacco Use   Smoking Status Never   Smokeless Tobacco Never                                                    Social History     Substance and Sexual Activity   Alcohol Use Never                                                    Current Outpatient Medications:     oxymetazoline (AFRIN) 0.05 % nasal spray, 2 sprays by Nasal route 2 times daily, Disp: , Rfl:     lisinopril (PRINIVIL;ZESTRIL) 5 MG tablet, Take 1 tablet by mouth daily, Disp: , Rfl:     atorvastatin (LIPITOR) 20 MG tablet, Take 1 tablet by mouth daily, Disp: , Rfl:     cyclobenzaprine (FLEXERIL) 10 MG tablet, as needed, Disp: , Rfl:     OnabotulinumtoxinA, Cosmetic, 100 units SOLR, Inject 100 Units into the skin once, Disp: , Rfl:     Cholecalciferol (VITAMIN D3) 50 MCG (2000 UT) CAPS, Take by mouth, Disp: , Rfl:     Multiple Vitamins-Minerals (CENTRUM SILVER ULTRA WOMENS) TABS, Take by mouth, Disp: , Rfl:                                                  Past Medical History:   Diagnosis Date    Arthritis     Chronic back pain     Dizziness     History of blood transfusion     1972 [address]    Hyperlipidemia     Kidney disease     Overactive bladder     Primary hypertension 1/8/2024    Rash     Syncope and collapse     3 XS IN LAST 3 MONTHS,UNKNOWN ORIGIN                                                    Past Surgical History:   Procedure Laterality Date

## 2024-01-16 DIAGNOSIS — J34.89 NASAL OBSTRUCTION: ICD-10-CM

## 2024-01-16 RX ORDER — IPRATROPIUM BROMIDE 21 UG/1
2 SPRAY, METERED NASAL 2 TIMES DAILY
Qty: 90 ML | Refills: 1 | Status: SHIPPED | OUTPATIENT
Start: 2024-01-16 | End: 2024-04-15

## 2024-07-03 ENCOUNTER — HOSPITAL ENCOUNTER (OUTPATIENT)
Dept: MRI IMAGING | Age: 84
Discharge: HOME | End: 2024-07-03
Attending: NEUROLOGICAL SURGERY
Payer: MEDICARE

## 2024-07-03 DIAGNOSIS — I67.1 CEREBRAL ANEURYSM, NONRUPTURED: ICD-10-CM

## 2024-07-03 PROCEDURE — 70544 MR ANGIOGRAPHY HEAD W/O DYE: CPT

## 2024-07-18 ENCOUNTER — TELEPHONE (OUTPATIENT)
Dept: CARDIOLOGY CLINIC | Age: 84
End: 2024-07-18

## 2024-07-18 NOTE — TELEPHONE ENCOUNTER
Patient's daughter came into the office stating the patient has an upcoming surgery on 7/26/24.  She provided a cardiac clearance request letter to be completed.  Request letter has been put into [employer] and the mailbox.

## 2024-10-26 ENCOUNTER — HOSPITAL ENCOUNTER (EMERGENCY)
Age: 84
Discharge: HOME | End: 2024-10-26
Attending: STUDENT IN AN ORGANIZED HEALTH CARE EDUCATION/TRAINING PROGRAM
Payer: MEDICARE

## 2024-10-26 ENCOUNTER — APPOINTMENT (OUTPATIENT)
Dept: GENERAL RADIOLOGY | Age: 84
End: 2024-10-26
Payer: MEDICARE

## 2024-10-26 VITALS
HEIGHT: 64 IN | DIASTOLIC BLOOD PRESSURE: 80 MMHG | HEART RATE: 68 BPM | RESPIRATION RATE: 19 BRPM | SYSTOLIC BLOOD PRESSURE: 164 MMHG | WEIGHT: 200 LBS | BODY MASS INDEX: 34.15 KG/M2 | OXYGEN SATURATION: 100 % | TEMPERATURE: 98.2 F

## 2024-10-26 DIAGNOSIS — R07.9 CHEST PAIN, UNSPECIFIED TYPE: Primary | ICD-10-CM

## 2024-10-26 LAB
TROPONIN, HIGH SENSITIVITY: 21 NG/L (ref 0–14)
TROPONIN, HIGH SENSITIVITY: 24 NG/L (ref 0–14)

## 2024-10-26 PROCEDURE — 6370000000 HC RX 637 (ALT 250 FOR IP): Performed by: STUDENT IN AN ORGANIZED HEALTH CARE EDUCATION/TRAINING PROGRAM

## 2024-10-26 PROCEDURE — 84484 ASSAY OF TROPONIN QUANT: CPT

## 2024-10-26 PROCEDURE — 93005 ELECTROCARDIOGRAM TRACING: CPT | Performed by: STUDENT IN AN ORGANIZED HEALTH CARE EDUCATION/TRAINING PROGRAM

## 2024-10-26 PROCEDURE — 71045 X-RAY EXAM CHEST 1 VIEW: CPT

## 2024-10-26 PROCEDURE — 99285 EMERGENCY DEPT VISIT HI MDM: CPT

## 2024-10-26 RX ADMIN — LIDOCAINE HYDROCHLORIDE: 20 SOLUTION ORAL at 20:24

## 2024-10-26 ASSESSMENT — PAIN SCALES - GENERAL: PAINLEVEL_OUTOF10: 4

## 2024-10-26 ASSESSMENT — PAIN DESCRIPTION - DESCRIPTORS: DESCRIPTORS: BURNING

## 2024-10-26 ASSESSMENT — PAIN DESCRIPTION - ORIENTATION: ORIENTATION: MID

## 2024-10-26 ASSESSMENT — PAIN DESCRIPTION - PAIN TYPE: TYPE: ACUTE PAIN

## 2024-10-26 ASSESSMENT — PAIN DESCRIPTION - LOCATION: LOCATION: CHEST

## 2024-10-26 ASSESSMENT — PAIN - FUNCTIONAL ASSESSMENT: PAIN_FUNCTIONAL_ASSESSMENT: ACTIVITIES ARE NOT PREVENTED

## 2024-10-26 ASSESSMENT — PAIN DESCRIPTION - FREQUENCY: FREQUENCY: CONTINUOUS

## 2024-10-27 LAB
EKG ATRIAL RATE: 57 BPM
EKG DIAGNOSIS: NORMAL
EKG P AXIS: 85 DEGREES
EKG P-R INTERVAL: 202 MS
EKG Q-T INTERVAL: 426 MS
EKG QRS DURATION: 96 MS
EKG QTC CALCULATION (BAZETT): 414 MS
EKG R AXIS: 21 DEGREES
EKG T AXIS: 62 DEGREES
EKG VENTRICULAR RATE: 57 BPM

## 2024-10-27 NOTE — ED PROVIDER NOTES
[facility]  EMERGENCY DEPARTMENT ENCOUNTER          ATTENDING PHYSICIAN NOTE       Date of evaluation: 10/26/2024    Chief Complaint     Chest Pain      History of Present Illness     Jeanne Ferrer is a 84 y.o. female who presents with with past medical history of GERD and hypertension presenting with chest discomfort.  She states she ran out of her GERD medication and has been dealing with a lot of stress the last few days and thinks that is what prompted her chest discomfort.  The discomfort started a few hours prior to arrival and has been persistent.  It does not seem worse with exertion.  She denies any shortness of breath, fevers or chills, nausea or vomiting, or abdominal pain.    ASSESSMENT / PLAN  (MEDICAL DECISION MAKING)     INITIAL VITALS: BP: (!) 182/67, Temp: 98.2 °F (36.8 °C), Pulse: 58, Respirations: 13, SpO2: 100 %      Jeanne Ferrer is a 84 y.o. female with past medical history of GERD and hypertension presenting with chest discomfort.    Differential diagnosis includes but is not limited to GERD, ACS, PE, pneumonia, MSK pain.    Chest x-ray obtained and does not show any evidence of pneumonia or pneumothorax.  Troponin stable x 2 with a nonischemic appearing EKG making ACS unlikely.  Of note, the patient's troponin is elevated mildly, however this appears to be her baseline.  She was treated symptomatically with GI cocktail with resolution of her symptoms.  Low suspicion for PE given the resolution of patient's symptoms after GI cocktail.    Encouraged the patient to restart her outpatient GERD medications and to  Pepcid if they do not seem to be working.  Recommend she follow-up with her PCP within 3 days and to return to the emergency department with worsening chest pain, shortness of breath, fevers or chills, or inability tolerate p.o.    Is this patient to be included in the SEP-1 core measure? No Exclusion criteria - the patient is NOT to be included for SEP-1 Core

## 2024-12-27 ENCOUNTER — HOSPITAL ENCOUNTER (OUTPATIENT)
Dept: MRI IMAGING | Age: 84
Discharge: HOME | End: 2024-12-27
Attending: NEUROLOGICAL SURGERY

## 2024-12-27 DIAGNOSIS — I67.1 CEREBRAL ANEURYSM, NONRUPTURED: ICD-10-CM

## 2024-12-29 ENCOUNTER — HOSPITAL ENCOUNTER (OUTPATIENT)
Dept: MRI IMAGING | Age: 84
Discharge: HOME | End: 2024-12-29
Payer: MEDICARE

## 2024-12-29 PROCEDURE — 70544 MR ANGIOGRAPHY HEAD W/O DYE: CPT

## 2024-12-30 ENCOUNTER — OFFICE VISIT (OUTPATIENT)
Dept: ENT CLINIC | Age: 84
End: 2024-12-30
Payer: MEDICARE

## 2024-12-30 VITALS
HEART RATE: 66 BPM | TEMPERATURE: 97.2 F | BODY MASS INDEX: 34.04 KG/M2 | HEIGHT: 64 IN | SYSTOLIC BLOOD PRESSURE: 114 MMHG | DIASTOLIC BLOOD PRESSURE: 53 MMHG | WEIGHT: 199.4 LBS

## 2024-12-30 DIAGNOSIS — M26.643 ARTHRITIS OF BOTH TEMPOROMANDIBULAR JOINTS: ICD-10-CM

## 2024-12-30 DIAGNOSIS — H61.23 BILATERAL IMPACTED CERUMEN: ICD-10-CM

## 2024-12-30 DIAGNOSIS — H92.03 REFERRED OTALGIA OF BOTH EARS: Primary | ICD-10-CM

## 2024-12-30 PROCEDURE — 3078F DIAST BP <80 MM HG: CPT | Performed by: OTOLARYNGOLOGY

## 2024-12-30 PROCEDURE — 69210 REMOVE IMPACTED EAR WAX UNI: CPT | Performed by: OTOLARYNGOLOGY

## 2024-12-30 PROCEDURE — 1123F ACP DISCUSS/DSCN MKR DOCD: CPT | Performed by: OTOLARYNGOLOGY

## 2024-12-30 PROCEDURE — 99213 OFFICE O/P EST LOW 20 MIN: CPT | Performed by: OTOLARYNGOLOGY

## 2024-12-30 PROCEDURE — 3074F SYST BP LT 130 MM HG: CPT | Performed by: OTOLARYNGOLOGY

## 2024-12-30 PROCEDURE — 1160F RVW MEDS BY RX/DR IN RCRD: CPT | Performed by: OTOLARYNGOLOGY

## 2024-12-30 PROCEDURE — 1159F MED LIST DOCD IN RCRD: CPT | Performed by: OTOLARYNGOLOGY

## 2024-12-30 RX ORDER — METHYLPREDNISOLONE 4 MG/1
TABLET ORAL
Qty: 1 KIT | Refills: 0 | Status: SHIPPED | OUTPATIENT
Start: 2024-12-30

## 2024-12-30 NOTE — PROGRESS NOTES
FOLLOW UP VISIT:    CHIEF COMPLAINT: Otalgia.    INTERIM HISTORY: Otalgia, bilateral.  1 week duration.  Pain extends down the back of the neck into the angle of the jaw on both sides.  Aggravated by jaw movement.  Hearing unchanged.  No otorrhea.  Otalgia.    PAST MEDICAL HISTORY:   Social History     Tobacco Use   Smoking Status Never   Smokeless Tobacco Never                                                    Social History     Substance and Sexual Activity   Alcohol Use Never                                                    Current Outpatient Medications:     lisinopril (PRINIVIL;ZESTRIL) 5 MG tablet, Take 1 tablet by mouth daily, Disp: , Rfl:     atorvastatin (LIPITOR) 20 MG tablet, Take 1 tablet by mouth daily, Disp: , Rfl:     OnabotulinumtoxinA, Cosmetic, 100 units SOLR, Inject 100 Units into the skin once, Disp: , Rfl:     Cholecalciferol (VITAMIN D3) 50 MCG (2000 UT) CAPS, Take by mouth, Disp: , Rfl:     Multiple Vitamins-Minerals (CENTRUM SILVER ULTRA WOMENS) TABS, Take by mouth, Disp: , Rfl:     ipratropium (ATROVENT) 0.03 % nasal spray, 2 sprays by Each Nostril route 2 times daily, Disp: 90 mL, Rfl: 1    oxymetazoline (AFRIN) 0.05 % nasal spray, 2 sprays by Nasal route 2 times daily (Patient not taking: Reported on 12/30/2024), Disp: , Rfl:                                                  Past Medical History:   Diagnosis Date    Aneurysm (HCC)     Arthritis     Chronic back pain     Dizziness     Encounter for imaging to screen for metal prior to MRI 12/05/2024    [employer] Bladder Stim- Model: 35202; lead model: [id number]; adversion 1.0Rep: Selam [phone]; mriguidelines scanned into - lrf    History of blood transfusion     1972 [address]    Hyperlipidemia     Kidney disease     Overactive bladder     Primary hypertension 01/08/2024    Rash     Syncope and collapse     3 XS IN LAST 3 MONTHS,UNKNOWN ORIGIN                                                    Past Surgical History:

## 2025-05-06 ENCOUNTER — TRANSCRIBE ORDERS (OUTPATIENT)
Dept: ADMINISTRATIVE | Age: 85
End: 2025-05-06

## 2025-05-06 DIAGNOSIS — I67.1 CEREBRAL ANEURYSM, NONRUPTURED: Primary | ICD-10-CM

## 2025-07-10 ENCOUNTER — HOSPITAL ENCOUNTER (OUTPATIENT)
Dept: MRI IMAGING | Age: 85
Discharge: HOME | End: 2025-07-10
Attending: NEUROLOGICAL SURGERY
Payer: MEDICARE

## 2025-07-10 DIAGNOSIS — I67.1 CEREBRAL ANEURYSM, NONRUPTURED: ICD-10-CM

## 2025-07-10 PROCEDURE — 76014 MR SFTY IMPLT&/FB ASMT STF 1: CPT

## (undated) DEVICE — STANDARD HYPODERMIC NEEDLE,POLYPROPYLENE HUB: Brand: MONOJECT

## (undated) DEVICE — 3M™ STERI-STRIP™ COMPOUND BENZOIN TINCTURE 40 BAGS/CARTON 4 CARTONS/CASE C1544: Brand: 3M™ STERI-STRIP™

## (undated) DEVICE — TOWEL,STOP FLAG GOLD N-W: Brand: MEDLINE

## (undated) DEVICE — INTENDED FOR TISSUE SEPARATION, AND OTHER PROCEDURES THAT REQUIRE A SHARP SURGICAL BLADE TO PUNCTURE OR CUT.: Brand: BARD-PARKER ® CARBON RIB-BACK BLADES

## (undated) DEVICE — SUTURE CHROMIC GUT SZ 4-0 L12IN ABSRB BRN M-1 L13MM 1/2 CIR 744G

## (undated) DEVICE — SOLUTION IV 1000ML 0.9% SOD CHL

## (undated) DEVICE — ELECTRODE PT RET AD L9FT HI MOIST COND ADH HYDRGEL CORDED

## (undated) DEVICE — SYRINGE MED 10ML TRNSLUC BRL PLUNG BLK MRK POLYPR CTRL

## (undated) DEVICE — GLOVE ORANGE PI 7   MSG9070

## (undated) DEVICE — NASAL FESS: Brand: MEDLINE INDUSTRIES, INC.

## (undated) DEVICE — TOWEL,OR,DSP,ST,BLUE,DLX,8/PK,10PK/CS: Brand: MEDLINE